# Patient Record
Sex: MALE | Race: BLACK OR AFRICAN AMERICAN | NOT HISPANIC OR LATINO
[De-identification: names, ages, dates, MRNs, and addresses within clinical notes are randomized per-mention and may not be internally consistent; named-entity substitution may affect disease eponyms.]

---

## 2017-01-03 ENCOUNTER — NON-APPOINTMENT (OUTPATIENT)
Age: 52
End: 2017-01-03

## 2017-01-03 ENCOUNTER — APPOINTMENT (OUTPATIENT)
Dept: CARDIOLOGY | Facility: CLINIC | Age: 52
End: 2017-01-03

## 2017-01-03 VITALS
RESPIRATION RATE: 12 BRPM | SYSTOLIC BLOOD PRESSURE: 117 MMHG | HEIGHT: 72 IN | OXYGEN SATURATION: 96 % | TEMPERATURE: 98.1 F | DIASTOLIC BLOOD PRESSURE: 75 MMHG | BODY MASS INDEX: 26.01 KG/M2 | HEART RATE: 67 BPM | WEIGHT: 192 LBS

## 2017-01-03 DIAGNOSIS — R00.2 PALPITATIONS: ICD-10-CM

## 2017-01-04 LAB
CHOLEST SERPL-MCNC: 209 MG/DL
CHOLEST/HDLC SERPL: 2.7 RATIO
HDLC SERPL-MCNC: 77 MG/DL
LDLC SERPL CALC-MCNC: 117 MG/DL
TRIGL SERPL-MCNC: 73 MG/DL

## 2017-01-08 ENCOUNTER — NON-APPOINTMENT (OUTPATIENT)
Age: 52
End: 2017-01-08

## 2017-01-10 LAB
ALBUMIN SERPL ELPH-MCNC: 4.2 G/DL
ALP BLD-CCNC: 79 U/L
ALT SERPL-CCNC: 51 U/L
ANION GAP SERPL CALC-SCNC: 19 MMOL/L
AST SERPL-CCNC: 30 U/L
BILIRUB SERPL-MCNC: 0.4 MG/DL
BUN SERPL-MCNC: 17 MG/DL
CALCIUM SERPL-MCNC: 9.7 MG/DL
CHLORIDE SERPL-SCNC: 102 MMOL/L
CO2 SERPL-SCNC: 22 MMOL/L
CREAT SERPL-MCNC: 1.33 MG/DL
GLUCOSE SERPL-MCNC: 74 MG/DL
POTASSIUM SERPL-SCNC: 4.2 MMOL/L
PROT SERPL-MCNC: 7.6 G/DL
SODIUM SERPL-SCNC: 143 MMOL/L

## 2017-04-03 ENCOUNTER — NON-APPOINTMENT (OUTPATIENT)
Age: 52
End: 2017-04-03

## 2017-04-03 ENCOUNTER — RESULT CHARGE (OUTPATIENT)
Age: 52
End: 2017-04-03

## 2017-04-03 ENCOUNTER — APPOINTMENT (OUTPATIENT)
Dept: CARDIOLOGY | Facility: CLINIC | Age: 52
End: 2017-04-03

## 2017-04-03 VITALS
DIASTOLIC BLOOD PRESSURE: 78 MMHG | OXYGEN SATURATION: 96 % | SYSTOLIC BLOOD PRESSURE: 129 MMHG | HEIGHT: 72 IN | RESPIRATION RATE: 12 BRPM | TEMPERATURE: 98.1 F | BODY MASS INDEX: 26.14 KG/M2 | HEART RATE: 76 BPM | WEIGHT: 193 LBS

## 2017-04-03 RX ORDER — ERGOCALCIFEROL (VITAMIN D2) 1250 MCG
50000 CAPSULE ORAL
Refills: 0 | Status: ACTIVE | COMMUNITY

## 2017-04-04 LAB
25(OH)D3 SERPL-MCNC: 46 NG/ML
CHOLEST SERPL-MCNC: 188 MG/DL
CHOLEST/HDLC SERPL: 2.9 RATIO
HDLC SERPL-MCNC: 64 MG/DL
LDLC SERPL CALC-MCNC: 109 MG/DL
TRIGL SERPL-MCNC: 73 MG/DL

## 2017-04-11 ENCOUNTER — APPOINTMENT (OUTPATIENT)
Dept: CARDIOLOGY | Facility: CLINIC | Age: 52
End: 2017-04-11

## 2017-04-12 LAB
ALBUMIN SERPL ELPH-MCNC: 4.4 G/DL
ALP BLD-CCNC: 105 U/L
ALT SERPL-CCNC: 113 U/L
ANION GAP SERPL CALC-SCNC: 18 MMOL/L
AST SERPL-CCNC: 95 U/L
BILIRUB SERPL-MCNC: 0.2 MG/DL
BUN SERPL-MCNC: 22 MG/DL
CALCIUM SERPL-MCNC: 9.4 MG/DL
CHLORIDE SERPL-SCNC: 104 MMOL/L
CO2 SERPL-SCNC: 22 MMOL/L
CREAT SERPL-MCNC: 1.33 MG/DL
GLUCOSE SERPL-MCNC: 78 MG/DL
POTASSIUM SERPL-SCNC: 4.2 MMOL/L
PROT SERPL-MCNC: 7.1 G/DL
SODIUM SERPL-SCNC: 143 MMOL/L

## 2017-04-19 LAB
ALBUMIN SERPL ELPH-MCNC: 4.3 G/DL
ALP BLD-CCNC: 80 U/L
ALT SERPL-CCNC: 47 U/L
ANION GAP SERPL CALC-SCNC: 21 MMOL/L
AST SERPL-CCNC: 42 U/L
BILIRUB SERPL-MCNC: 0.3 MG/DL
BUN SERPL-MCNC: 16 MG/DL
CALCIUM SERPL-MCNC: 9.6 MG/DL
CHLORIDE SERPL-SCNC: 103 MMOL/L
CO2 SERPL-SCNC: 19 MMOL/L
CREAT SERPL-MCNC: 1.41 MG/DL
GLUCOSE SERPL-MCNC: 64 MG/DL
POTASSIUM SERPL-SCNC: 4.1 MMOL/L
PROT SERPL-MCNC: 7.6 G/DL
SODIUM SERPL-SCNC: 143 MMOL/L

## 2017-04-20 ENCOUNTER — TRANSCRIPTION ENCOUNTER (OUTPATIENT)
Age: 52
End: 2017-04-20

## 2017-04-28 ENCOUNTER — TRANSCRIPTION ENCOUNTER (OUTPATIENT)
Age: 52
End: 2017-04-28

## 2017-05-03 ENCOUNTER — TRANSCRIPTION ENCOUNTER (OUTPATIENT)
Age: 52
End: 2017-05-03

## 2017-08-09 ENCOUNTER — APPOINTMENT (OUTPATIENT)
Dept: CARDIOLOGY | Facility: CLINIC | Age: 52
End: 2017-08-09
Payer: MEDICAID

## 2017-08-09 ENCOUNTER — NON-APPOINTMENT (OUTPATIENT)
Age: 52
End: 2017-08-09

## 2017-08-09 VITALS
BODY MASS INDEX: 26.55 KG/M2 | WEIGHT: 196 LBS | HEART RATE: 89 BPM | SYSTOLIC BLOOD PRESSURE: 114 MMHG | HEIGHT: 72 IN | DIASTOLIC BLOOD PRESSURE: 77 MMHG | OXYGEN SATURATION: 99 % | RESPIRATION RATE: 12 BRPM

## 2017-08-09 PROCEDURE — 99214 OFFICE O/P EST MOD 30 MIN: CPT | Mod: 25

## 2017-08-09 PROCEDURE — 93000 ELECTROCARDIOGRAM COMPLETE: CPT

## 2017-08-10 LAB
ALBUMIN SERPL ELPH-MCNC: 4.4 G/DL
ALP BLD-CCNC: 83 U/L
ALT SERPL-CCNC: 63 U/L
ANION GAP SERPL CALC-SCNC: 15 MMOL/L
AST SERPL-CCNC: 32 U/L
BILIRUB SERPL-MCNC: 0.2 MG/DL
BUN SERPL-MCNC: 16 MG/DL
CALCIUM SERPL-MCNC: 9.4 MG/DL
CHLORIDE SERPL-SCNC: 107 MMOL/L
CHOLEST SERPL-MCNC: 275 MG/DL
CHOLEST/HDLC SERPL: 4.7 RATIO
CO2 SERPL-SCNC: 22 MMOL/L
CREAT SERPL-MCNC: 1.63 MG/DL
GGT SERPL-CCNC: 35 U/L
GLUCOSE SERPL-MCNC: 105 MG/DL
HDLC SERPL-MCNC: 58 MG/DL
LDLC SERPL CALC-MCNC: 173 MG/DL
POTASSIUM SERPL-SCNC: 4.5 MMOL/L
PROT SERPL-MCNC: 7.4 G/DL
SODIUM SERPL-SCNC: 144 MMOL/L
TRIGL SERPL-MCNC: 219 MG/DL

## 2017-08-17 LAB — 25(OH)D3 SERPL-MCNC: 32.7 NG/ML

## 2017-09-01 ENCOUNTER — TRANSCRIPTION ENCOUNTER (OUTPATIENT)
Age: 52
End: 2017-09-01

## 2017-09-06 ENCOUNTER — MEDICATION RENEWAL (OUTPATIENT)
Age: 52
End: 2017-09-06

## 2017-09-13 LAB
ALBUMIN SERPL ELPH-MCNC: 4.4 G/DL
ALP BLD-CCNC: 70 U/L
ALT SERPL-CCNC: 56 U/L
AST SERPL-CCNC: 44 U/L
BILIRUB DIRECT SERPL-MCNC: 0.2 MG/DL
BILIRUB INDIRECT SERPL-MCNC: 0.3 MG/DL
BILIRUB SERPL-MCNC: 0.5 MG/DL
PROT SERPL-MCNC: 8.3 G/DL

## 2017-10-25 ENCOUNTER — APPOINTMENT (OUTPATIENT)
Dept: NEPHROLOGY | Facility: CLINIC | Age: 52
End: 2017-10-25
Payer: MEDICAID

## 2017-10-25 VITALS
HEIGHT: 72 IN | DIASTOLIC BLOOD PRESSURE: 76 MMHG | BODY MASS INDEX: 26.28 KG/M2 | OXYGEN SATURATION: 98 % | WEIGHT: 194 LBS | SYSTOLIC BLOOD PRESSURE: 108 MMHG | HEART RATE: 66 BPM

## 2017-10-25 PROCEDURE — 99204 OFFICE O/P NEW MOD 45 MIN: CPT

## 2017-10-25 PROCEDURE — 99213 OFFICE O/P EST LOW 20 MIN: CPT

## 2017-10-25 RX ORDER — AMOXICILLIN AND CLAVULANATE POTASSIUM 875; 125 MG/1; MG/1
875-125 TABLET, COATED ORAL
Qty: 20 | Refills: 0 | Status: COMPLETED | COMMUNITY
Start: 2017-05-03

## 2017-10-27 LAB
25(OH)D3 SERPL-MCNC: 52.4 NG/ML
ALBUMIN SERPL ELPH-MCNC: 4.2 G/DL
ANA SER IF-ACNC: NEGATIVE
ANION GAP SERPL CALC-SCNC: 15 MMOL/L
APPEARANCE: CLEAR
BACTERIA: NEGATIVE
BASOPHILS # BLD AUTO: 0.02 K/UL
BASOPHILS NFR BLD AUTO: 0.3 %
BILIRUBIN URINE: NEGATIVE
BLOOD URINE: NEGATIVE
BUN SERPL-MCNC: 14 MG/DL
CALCIUM SERPL-MCNC: 10.4 MG/DL
CALCIUM SERPL-MCNC: 10.4 MG/DL
CHLORIDE SERPL-SCNC: 103 MMOL/L
CK SERPL-CCNC: 191 U/L
CO2 SERPL-SCNC: 23 MMOL/L
COLOR: YELLOW
CREAT SERPL-MCNC: 1.11 MG/DL
CREAT SPEC-SCNC: 227 MG/DL
CREAT/PROT UR: 0 RATIO
DSDNA AB SER-ACNC: <12 IU/ML
EOSINOPHIL # BLD AUTO: 0.16 K/UL
EOSINOPHIL NFR BLD AUTO: 2.6 %
GLUCOSE QUALITATIVE U: NEGATIVE MG/DL
GLUCOSE SERPL-MCNC: 84 MG/DL
HCT VFR BLD CALC: 43.5 %
HGB BLD-MCNC: 14.1 G/DL
HYALINE CASTS: 0 /LPF
IMM GRANULOCYTES NFR BLD AUTO: 0.2 %
KETONES URINE: NEGATIVE
LEUKOCYTE ESTERASE URINE: NEGATIVE
LYMPHOCYTES # BLD AUTO: 2.26 K/UL
LYMPHOCYTES NFR BLD AUTO: 37.4 %
MAN DIFF?: NORMAL
MCHC RBC-ENTMCNC: 27.8 PG
MCHC RBC-ENTMCNC: 32.4 GM/DL
MCV RBC AUTO: 85.8 FL
MICROSCOPIC-UA: NORMAL
MONOCYTES # BLD AUTO: 0.79 K/UL
MONOCYTES NFR BLD AUTO: 13.1 %
NEUTROPHILS # BLD AUTO: 2.8 K/UL
NEUTROPHILS NFR BLD AUTO: 46.4 %
NITRITE URINE: NEGATIVE
PARATHYROID HORMONE INTACT: 28 PG/ML
PH URINE: 5.5
PHOSPHATE SERPL-MCNC: 3.2 MG/DL
PLATELET # BLD AUTO: 335 K/UL
POTASSIUM SERPL-SCNC: 4.3 MMOL/L
PROT UR-MCNC: 11 MG/DL
PROTEIN URINE: NEGATIVE MG/DL
RBC # BLD: 5.07 M/UL
RBC # FLD: 14.1 %
RED BLOOD CELLS URINE: 0 /HPF
SODIUM SERPL-SCNC: 141 MMOL/L
SPECIFIC GRAVITY URINE: 1.02
SQUAMOUS EPITHELIAL CELLS: 0 /HPF
UROBILINOGEN URINE: NEGATIVE MG/DL
WBC # FLD AUTO: 6.04 K/UL
WHITE BLOOD CELLS URINE: 0 /HPF

## 2017-11-14 ENCOUNTER — NON-APPOINTMENT (OUTPATIENT)
Age: 52
End: 2017-11-14

## 2017-11-14 ENCOUNTER — APPOINTMENT (OUTPATIENT)
Dept: CARDIOLOGY | Facility: CLINIC | Age: 52
End: 2017-11-14
Payer: MEDICAID

## 2017-11-14 VITALS
BODY MASS INDEX: 26.01 KG/M2 | RESPIRATION RATE: 12 BRPM | OXYGEN SATURATION: 97 % | DIASTOLIC BLOOD PRESSURE: 76 MMHG | HEART RATE: 87 BPM | WEIGHT: 192 LBS | SYSTOLIC BLOOD PRESSURE: 114 MMHG | HEIGHT: 72 IN

## 2017-11-14 PROCEDURE — 99214 OFFICE O/P EST MOD 30 MIN: CPT | Mod: 25

## 2017-11-14 PROCEDURE — 93000 ELECTROCARDIOGRAM COMPLETE: CPT

## 2017-11-19 ENCOUNTER — NON-APPOINTMENT (OUTPATIENT)
Age: 52
End: 2017-11-19

## 2017-11-20 LAB
CHOLEST SERPL-MCNC: 195 MG/DL
CHOLEST/HDLC SERPL: 3.2 RATIO
HDLC SERPL-MCNC: 61 MG/DL
LDLC SERPL CALC-MCNC: 124 MG/DL
TRIGL SERPL-MCNC: 52 MG/DL

## 2017-12-11 DIAGNOSIS — R74.8 ABNORMAL LEVELS OF OTHER SERUM ENZYMES: ICD-10-CM

## 2017-12-11 LAB
ALBUMIN SERPL ELPH-MCNC: 4.6 G/DL
ALP BLD-CCNC: 87 U/L
ALT SERPL-CCNC: 53 U/L
ANION GAP SERPL CALC-SCNC: 15 MMOL/L
AST SERPL-CCNC: 29 U/L
BILIRUB SERPL-MCNC: 0.4 MG/DL
BUN SERPL-MCNC: 14 MG/DL
CALCIUM SERPL-MCNC: 9.8 MG/DL
CHLORIDE SERPL-SCNC: 102 MMOL/L
CO2 SERPL-SCNC: 24 MMOL/L
CREAT SERPL-MCNC: 1.33 MG/DL
GLUCOSE SERPL-MCNC: 94 MG/DL
POTASSIUM SERPL-SCNC: 4.5 MMOL/L
PROT SERPL-MCNC: 8.4 G/DL
SODIUM SERPL-SCNC: 141 MMOL/L

## 2018-02-14 LAB
ALBUMIN SERPL ELPH-MCNC: 4.2 G/DL
ALP BLD-CCNC: 69 U/L
ALT SERPL-CCNC: 60 U/L
ANION GAP SERPL CALC-SCNC: 14 MMOL/L
AST SERPL-CCNC: 34 U/L
BILIRUB SERPL-MCNC: 0.2 MG/DL
BUN SERPL-MCNC: 15 MG/DL
CALCIUM SERPL-MCNC: 9.6 MG/DL
CHLORIDE SERPL-SCNC: 105 MMOL/L
CO2 SERPL-SCNC: 25 MMOL/L
CREAT SERPL-MCNC: 1.41 MG/DL
GLUCOSE SERPL-MCNC: 82 MG/DL
POTASSIUM SERPL-SCNC: 4.3 MMOL/L
PROT SERPL-MCNC: 7.7 G/DL
SODIUM SERPL-SCNC: 144 MMOL/L

## 2018-04-03 ENCOUNTER — APPOINTMENT (OUTPATIENT)
Dept: CARDIOLOGY | Facility: CLINIC | Age: 53
End: 2018-04-03

## 2018-04-24 ENCOUNTER — RESULT REVIEW (OUTPATIENT)
Age: 53
End: 2018-04-24

## 2018-05-23 DIAGNOSIS — R19.8 OTHER SPECIFIED SYMPTOMS AND SIGNS INVOLVING THE DIGESTIVE SYSTEM AND ABDOMEN: ICD-10-CM

## 2018-06-06 ENCOUNTER — RX RENEWAL (OUTPATIENT)
Age: 53
End: 2018-06-06

## 2018-07-05 ENCOUNTER — APPOINTMENT (OUTPATIENT)
Dept: GASTROENTEROLOGY | Facility: HOSPITAL | Age: 53
End: 2018-07-05

## 2018-07-05 ENCOUNTER — RESULT REVIEW (OUTPATIENT)
Age: 53
End: 2018-07-05

## 2018-07-05 ENCOUNTER — OUTPATIENT (OUTPATIENT)
Dept: OUTPATIENT SERVICES | Facility: HOSPITAL | Age: 53
LOS: 1 days | Discharge: HOME | End: 2018-07-05
Payer: MEDICAID

## 2018-07-05 DIAGNOSIS — K31.9 DISEASE OF STOMACH AND DUODENUM, UNSPECIFIED: ICD-10-CM

## 2018-07-05 PROCEDURE — 88305 TISSUE EXAM BY PATHOLOGIST: CPT | Mod: 26

## 2018-07-05 PROCEDURE — 43259 EGD US EXAM DUODENUM/JEJUNUM: CPT | Mod: GC

## 2018-07-05 PROCEDURE — 43239 EGD BIOPSY SINGLE/MULTIPLE: CPT | Mod: 59,GC

## 2018-07-12 ENCOUNTER — NON-APPOINTMENT (OUTPATIENT)
Age: 53
End: 2018-07-12

## 2018-07-12 ENCOUNTER — APPOINTMENT (OUTPATIENT)
Dept: CARDIOLOGY | Facility: CLINIC | Age: 53
End: 2018-07-12
Payer: MEDICAID

## 2018-07-12 VITALS
OXYGEN SATURATION: 97 % | HEART RATE: 78 BPM | TEMPERATURE: 98.1 F | WEIGHT: 195 LBS | RESPIRATION RATE: 12 BRPM | BODY MASS INDEX: 26.41 KG/M2 | DIASTOLIC BLOOD PRESSURE: 76 MMHG | SYSTOLIC BLOOD PRESSURE: 120 MMHG | HEIGHT: 72 IN

## 2018-07-12 PROCEDURE — 93000 ELECTROCARDIOGRAM COMPLETE: CPT

## 2018-07-12 PROCEDURE — 99214 OFFICE O/P EST MOD 30 MIN: CPT | Mod: 25

## 2018-07-13 ENCOUNTER — CHART COPY (OUTPATIENT)
Age: 53
End: 2018-07-13

## 2018-08-13 LAB
ALBUMIN SERPL ELPH-MCNC: 4.2 G/DL
ALP BLD-CCNC: 74 U/L
ALT SERPL-CCNC: 134 U/L
ANION GAP SERPL CALC-SCNC: 15 MMOL/L
AST SERPL-CCNC: 201 U/L
BILIRUB SERPL-MCNC: 0.3 MG/DL
BUN SERPL-MCNC: 24 MG/DL
CALCIUM SERPL-MCNC: 9.7 MG/DL
CHLORIDE SERPL-SCNC: 100 MMOL/L
CHOLEST SERPL-MCNC: 185 MG/DL
CHOLEST/HDLC SERPL: 3.1 RATIO
CO2 SERPL-SCNC: 24 MMOL/L
CREAT SERPL-MCNC: 1.78 MG/DL
GLUCOSE SERPL-MCNC: 82 MG/DL
HDLC SERPL-MCNC: 60 MG/DL
LDLC SERPL CALC-MCNC: 105 MG/DL
POTASSIUM SERPL-SCNC: 4.3 MMOL/L
PROT SERPL-MCNC: 7.7 G/DL
SODIUM SERPL-SCNC: 139 MMOL/L
TRIGL SERPL-MCNC: 100 MG/DL

## 2018-08-31 LAB
ALBUMIN SERPL ELPH-MCNC: 4.6 G/DL
ALP BLD-CCNC: 70 U/L
ALT SERPL-CCNC: 39 U/L
ANION GAP SERPL CALC-SCNC: 15 MMOL/L
AST SERPL-CCNC: 33 U/L
BILIRUB SERPL-MCNC: 0.4 MG/DL
BUN SERPL-MCNC: 14 MG/DL
CALCIUM SERPL-MCNC: 9.5 MG/DL
CHLORIDE SERPL-SCNC: 101 MMOL/L
CO2 SERPL-SCNC: 26 MMOL/L
CREAT SERPL-MCNC: 1.68 MG/DL
GLUCOSE SERPL-MCNC: 108 MG/DL
POTASSIUM SERPL-SCNC: 4.3 MMOL/L
PROT SERPL-MCNC: 7.8 G/DL
SODIUM SERPL-SCNC: 142 MMOL/L

## 2018-08-31 RX ORDER — ROSUVASTATIN CALCIUM 5 MG/1
5 TABLET, FILM COATED ORAL DAILY
Qty: 30 | Refills: 3 | Status: DISCONTINUED | COMMUNITY
Start: 2017-08-17 | End: 2018-08-31

## 2018-11-02 LAB
ALBUMIN SERPL ELPH-MCNC: 4.4 G/DL
ALP BLD-CCNC: 80 U/L
ALT SERPL-CCNC: 49 U/L
ANION GAP SERPL CALC-SCNC: 14 MMOL/L
AST SERPL-CCNC: 35 U/L
BILIRUB SERPL-MCNC: 0.3 MG/DL
BUN SERPL-MCNC: 12 MG/DL
CALCIUM SERPL-MCNC: 9.5 MG/DL
CHLORIDE SERPL-SCNC: 104 MMOL/L
CHOLEST SERPL-MCNC: 233 MG/DL
CHOLEST/HDLC SERPL: 3.6 RATIO
CO2 SERPL-SCNC: 26 MMOL/L
CREAT SERPL-MCNC: 1.33 MG/DL
GLUCOSE SERPL-MCNC: 73 MG/DL
HDLC SERPL-MCNC: 65 MG/DL
LDLC SERPL CALC-MCNC: 150 MG/DL
POTASSIUM SERPL-SCNC: 4.7 MMOL/L
PROT SERPL-MCNC: 8 G/DL
SODIUM SERPL-SCNC: 144 MMOL/L
TRIGL SERPL-MCNC: 91 MG/DL

## 2018-11-14 ENCOUNTER — NON-APPOINTMENT (OUTPATIENT)
Age: 53
End: 2018-11-14

## 2018-11-14 ENCOUNTER — APPOINTMENT (OUTPATIENT)
Dept: CARDIOLOGY | Facility: CLINIC | Age: 53
End: 2018-11-14
Payer: MEDICAID

## 2018-11-14 VITALS
BODY MASS INDEX: 25.73 KG/M2 | HEIGHT: 72 IN | SYSTOLIC BLOOD PRESSURE: 107 MMHG | TEMPERATURE: 97.8 F | WEIGHT: 190 LBS | HEART RATE: 65 BPM | OXYGEN SATURATION: 96 % | DIASTOLIC BLOOD PRESSURE: 71 MMHG | RESPIRATION RATE: 12 BRPM

## 2018-11-14 PROCEDURE — 99214 OFFICE O/P EST MOD 30 MIN: CPT | Mod: 25

## 2018-11-14 PROCEDURE — 93000 ELECTROCARDIOGRAM COMPLETE: CPT

## 2018-11-14 NOTE — PHYSICAL EXAM
[General Appearance - Well Developed] : well developed [Normal Appearance] : normal appearance [Well Groomed] : well groomed [General Appearance - Well Nourished] : well nourished [No Deformities] : no deformities [General Appearance - In No Acute Distress] : no acute distress [Normal Conjunctiva] : the conjunctiva exhibited no abnormalities [Normal Oral Mucosa] : normal oral mucosa [No Oral Pallor] : no oral pallor [No Oral Cyanosis] : no oral cyanosis [Normal Jugular Venous A Waves Present] : normal jugular venous A waves present [Normal Jugular Venous V Waves Present] : normal jugular venous V waves present [No Jugular Venous Felipe A Waves] : no jugular venous felipe A waves [Respiration, Rhythm And Depth] : normal respiratory rhythm and effort [Exaggerated Use Of Accessory Muscles For Inspiration] : no accessory muscle use [Auscultation Breath Sounds / Voice Sounds] : lungs were clear to auscultation bilaterally [Normal Rate] : normal [Rhythm Regular] : regular [Normal S1] : normal S1 [Normal S2] : normal S2 [No Murmur] : no murmurs heard [No Pitting Edema] : no pitting edema present [Bowel Sounds] : normal bowel sounds [Abdomen Soft] : soft [Abdomen Tenderness] : non-tender [Abnormal Walk] : normal gait [Gait - Sufficient For Exercise Testing] : the gait was sufficient for exercise testing [Nail Clubbing] : no clubbing of the fingernails [Cyanosis, Localized] : no localized cyanosis [Petechial Hemorrhages (___cm)] : no petechial hemorrhages [Skin Color & Pigmentation] : normal skin color and pigmentation [] : no rash [Skin Lesions] : no skin lesions [No Skin Ulcers] : no skin ulcer [Oriented To Time, Place, And Person] : oriented to person, place, and time [Affect] : the affect was normal [Mood] : the mood was normal [No Anxiety] : not feeling anxious [FreeTextEntry1] : Extraocular muscles intact. Anicteric sclerae. [S3] : no S3 [Right Carotid Bruit] : no bruit heard over the right carotid [Left Carotid Bruit] : no bruit heard over the left carotid [Bruit] : no bruit heard

## 2018-11-14 NOTE — DISCUSSION/SUMMARY
[FreeTextEntry1] : IMPRESSION: Mr. Moctezuma is a 52 year old man with a history of hyperlipidemia, PFO, elevated LFTs, renal insufficiency, vitamin D deficiency, H. pylori, and former tobacco use who presents today for followup of hyperlipidemia.\par \par PLAN:\par 1. His most recent cholesterol was elevated, however, he states that he started taking Pravastatin 10mg daily about 2 weeks ago. He will continue on diet modification given his hyperlipidemia. His LFTs were elevated on Crestor. He will come back in 4 weeks to check a lipid profile and CMP. He is scheduled to see his nephrologist in early December given his renal insufficiency.\par 2. He will followup with me in 3 months or sooner should there be any significant abnormalities on his blood work. \par 3. He will follow up with GI given his elevated LFTs, which revealed a mildly elevated ALT on his most recent blood work.

## 2018-11-26 ENCOUNTER — RX RENEWAL (OUTPATIENT)
Age: 53
End: 2018-11-26

## 2018-12-05 ENCOUNTER — APPOINTMENT (OUTPATIENT)
Dept: NEPHROLOGY | Facility: CLINIC | Age: 53
End: 2018-12-05

## 2018-12-19 ENCOUNTER — RX RENEWAL (OUTPATIENT)
Age: 53
End: 2018-12-19

## 2019-02-06 LAB
ALBUMIN SERPL ELPH-MCNC: 4 G/DL
ALP BLD-CCNC: 85 U/L
ALT SERPL-CCNC: 41 U/L
ANION GAP SERPL CALC-SCNC: 11 MMOL/L
AST SERPL-CCNC: 34 U/L
BILIRUB SERPL-MCNC: 0.3 MG/DL
BUN SERPL-MCNC: 14 MG/DL
CALCIUM SERPL-MCNC: 9.3 MG/DL
CHLORIDE SERPL-SCNC: 103 MMOL/L
CHOLEST SERPL-MCNC: 223 MG/DL
CHOLEST/HDLC SERPL: 3.1 RATIO
CO2 SERPL-SCNC: 26 MMOL/L
CREAT SERPL-MCNC: 1.14 MG/DL
GLUCOSE SERPL-MCNC: 81 MG/DL
HDLC SERPL-MCNC: 73 MG/DL
LDLC SERPL CALC-MCNC: 125 MG/DL
POTASSIUM SERPL-SCNC: 4.2 MMOL/L
PROT SERPL-MCNC: 7.5 G/DL
SODIUM SERPL-SCNC: 140 MMOL/L
TRIGL SERPL-MCNC: 124 MG/DL

## 2019-03-01 ENCOUNTER — TRANSCRIPTION ENCOUNTER (OUTPATIENT)
Age: 54
End: 2019-03-01

## 2019-03-06 ENCOUNTER — RX RENEWAL (OUTPATIENT)
Age: 54
End: 2019-03-06

## 2019-06-03 ENCOUNTER — RX RENEWAL (OUTPATIENT)
Age: 54
End: 2019-06-03

## 2019-06-07 ENCOUNTER — NON-APPOINTMENT (OUTPATIENT)
Age: 54
End: 2019-06-07

## 2019-06-07 ENCOUNTER — APPOINTMENT (OUTPATIENT)
Dept: CARDIOLOGY | Facility: CLINIC | Age: 54
End: 2019-06-07
Payer: MEDICAID

## 2019-06-07 VITALS
HEIGHT: 72 IN | WEIGHT: 195 LBS | HEART RATE: 74 BPM | DIASTOLIC BLOOD PRESSURE: 76 MMHG | RESPIRATION RATE: 12 BRPM | OXYGEN SATURATION: 98 % | BODY MASS INDEX: 26.41 KG/M2 | SYSTOLIC BLOOD PRESSURE: 113 MMHG

## 2019-06-07 PROCEDURE — 93000 ELECTROCARDIOGRAM COMPLETE: CPT

## 2019-06-07 PROCEDURE — 99213 OFFICE O/P EST LOW 20 MIN: CPT | Mod: 25

## 2019-06-07 NOTE — HISTORY OF PRESENT ILLNESS
[FreeTextEntry1] : Patient is a 53 year old man with a history of hyperlipidemia, PFO, elevated LFTs, renal insufficiency, H. pylori, and former tobacco use who presents today for followup of hyperlipidemia. He states that he has been feeling well denying any chest pain, dyspnea, palpitations, headaches, and dizziness. He states that he has tolerated taking Pravastatin and has been watching diet.

## 2019-06-07 NOTE — PHYSICAL EXAM
[General Appearance - Well Developed] : well developed [Normal Appearance] : normal appearance [Well Groomed] : well groomed [General Appearance - Well Nourished] : well nourished [No Deformities] : no deformities [General Appearance - In No Acute Distress] : no acute distress [Normal Conjunctiva] : the conjunctiva exhibited no abnormalities [FreeTextEntry1] : Extraocular muscles intact. Anicteric sclerae. [Normal Oral Mucosa] : normal oral mucosa [No Oral Pallor] : no oral pallor [No Oral Cyanosis] : no oral cyanosis [Normal Jugular Venous A Waves Present] : normal jugular venous A waves present [No Jugular Venous Felipe A Waves] : no jugular venous felipe A waves [Normal Jugular Venous V Waves Present] : normal jugular venous V waves present [Exaggerated Use Of Accessory Muscles For Inspiration] : no accessory muscle use [Respiration, Rhythm And Depth] : normal respiratory rhythm and effort [Auscultation Breath Sounds / Voice Sounds] : lungs were clear to auscultation bilaterally [Bowel Sounds] : normal bowel sounds [Abdomen Soft] : soft [Abdomen Tenderness] : non-tender [Abnormal Walk] : normal gait [Gait - Sufficient For Exercise Testing] : the gait was sufficient for exercise testing [Nail Clubbing] : no clubbing of the fingernails [Cyanosis, Localized] : no localized cyanosis [Petechial Hemorrhages (___cm)] : no petechial hemorrhages [Skin Color & Pigmentation] : normal skin color and pigmentation [] : no rash [Skin Lesions] : no skin lesions [No Skin Ulcers] : no skin ulcer [Oriented To Time, Place, And Person] : oriented to person, place, and time [Affect] : the affect was normal [No Anxiety] : not feeling anxious [Mood] : the mood was normal [Normal Rate] : normal [Rhythm Regular] : regular [Normal S1] : normal S1 [Normal S2] : normal S2 [S3] : no S3 [No Murmur] : no murmurs heard [Right Carotid Bruit] : no bruit heard over the right carotid [Left Carotid Bruit] : no bruit heard over the left carotid [Bruit] : no bruit heard [No Pitting Edema] : no pitting edema present

## 2019-06-07 NOTE — DISCUSSION/SUMMARY
[FreeTextEntry1] : IMPRESSION: Mr. Moctezuma is a 53 year old man with a history of hyperlipidemia, PFO, elevated LFTs, renal insufficiency, vitamin D deficiency, H. pylori, and former tobacco use who presents today for followup of hyperlipidemia.\par \par PLAN:\par 1. His most recent LDL was improved. I will be checking a lipid profile and CMP today. He will continue on Pravastatin 10mg daily as well as diet modification. \par 2. He will continue on ASA 81 mg daily given his PFO.\par 3. He will followup with me in 6 months or sooner should he experience any symptoms in the interim.

## 2019-06-18 ENCOUNTER — TRANSCRIPTION ENCOUNTER (OUTPATIENT)
Age: 54
End: 2019-06-18

## 2019-06-19 LAB
25(OH)D3 SERPL-MCNC: 46.4 NG/ML
BASOPHILS # BLD AUTO: 0.03 K/UL
BASOPHILS NFR BLD AUTO: 0.5 %
CHOLEST SERPL-MCNC: 213 MG/DL
CHOLEST/HDLC SERPL: 3.4 RATIO
EOSINOPHIL # BLD AUTO: 0.36 K/UL
EOSINOPHIL NFR BLD AUTO: 6.6 %
HCT VFR BLD CALC: 48.9 %
HDLC SERPL-MCNC: 62 MG/DL
HGB BLD-MCNC: 14.6 G/DL
IMM GRANULOCYTES NFR BLD AUTO: 0 %
LDLC SERPL CALC-MCNC: 135 MG/DL
LYMPHOCYTES # BLD AUTO: 2.48 K/UL
LYMPHOCYTES NFR BLD AUTO: 45.4 %
MAN DIFF?: NORMAL
MCHC RBC-ENTMCNC: 27.4 PG
MCHC RBC-ENTMCNC: 29.9 GM/DL
MCV RBC AUTO: 91.7 FL
MONOCYTES # BLD AUTO: 0.75 K/UL
MONOCYTES NFR BLD AUTO: 13.7 %
NEUTROPHILS # BLD AUTO: 1.84 K/UL
NEUTROPHILS NFR BLD AUTO: 33.8 %
PLATELET # BLD AUTO: 276 K/UL
RBC # BLD: 5.33 M/UL
RBC # FLD: 14.9 %
TRIGL SERPL-MCNC: 79 MG/DL
TSH SERPL-ACNC: 0.88 UIU/ML
WBC # FLD AUTO: 5.46 K/UL

## 2019-07-01 LAB
ALBUMIN SERPL ELPH-MCNC: 4.4 G/DL
ALP BLD-CCNC: 75 U/L
ALT SERPL-CCNC: 60 U/L
ANION GAP SERPL CALC-SCNC: 14 MMOL/L
AST SERPL-CCNC: 33 U/L
BILIRUB SERPL-MCNC: 0.4 MG/DL
BUN SERPL-MCNC: 17 MG/DL
CALCIUM SERPL-MCNC: 9.5 MG/DL
CHLORIDE SERPL-SCNC: 101 MMOL/L
CO2 SERPL-SCNC: 25 MMOL/L
CREAT SERPL-MCNC: 1.3 MG/DL
GLUCOSE SERPL-MCNC: 82 MG/DL
POTASSIUM SERPL-SCNC: 4.8 MMOL/L
PROT SERPL-MCNC: 7.5 G/DL
SODIUM SERPL-SCNC: 140 MMOL/L

## 2019-10-01 ENCOUNTER — RX RENEWAL (OUTPATIENT)
Age: 54
End: 2019-10-01

## 2019-10-03 ENCOUNTER — APPOINTMENT (OUTPATIENT)
Dept: CARDIOLOGY | Facility: CLINIC | Age: 54
End: 2019-10-03
Payer: COMMERCIAL

## 2019-10-03 ENCOUNTER — INPATIENT (INPATIENT)
Facility: HOSPITAL | Age: 54
LOS: 2 days | Discharge: HOME | End: 2019-10-06
Attending: UROLOGY | Admitting: UROLOGY
Payer: MEDICAID

## 2019-10-03 VITALS
SYSTOLIC BLOOD PRESSURE: 99 MMHG | OXYGEN SATURATION: 96 % | HEART RATE: 91 BPM | TEMPERATURE: 98 F | DIASTOLIC BLOOD PRESSURE: 75 MMHG | RESPIRATION RATE: 20 BRPM

## 2019-10-03 VITALS
HEART RATE: 69 BPM | HEIGHT: 72 IN | DIASTOLIC BLOOD PRESSURE: 78 MMHG | WEIGHT: 186 LBS | TEMPERATURE: 97.5 F | BODY MASS INDEX: 25.19 KG/M2 | SYSTOLIC BLOOD PRESSURE: 131 MMHG | OXYGEN SATURATION: 98 % | RESPIRATION RATE: 14 BRPM

## 2019-10-03 LAB
ALBUMIN SERPL ELPH-MCNC: 4.8 G/DL — SIGNIFICANT CHANGE UP (ref 3.3–5)
ALP SERPL-CCNC: 75 U/L — SIGNIFICANT CHANGE UP (ref 40–120)
ALT FLD-CCNC: 40 U/L — SIGNIFICANT CHANGE UP (ref 4–41)
ANION GAP SERPL CALC-SCNC: 14 MMO/L — SIGNIFICANT CHANGE UP (ref 7–14)
AST SERPL-CCNC: 50 U/L — HIGH (ref 4–40)
BASE EXCESS BLDV CALC-SCNC: 3.9 MMOL/L — SIGNIFICANT CHANGE UP
BASE EXCESS BLDV CALC-SCNC: 5.1 MMOL/L — SIGNIFICANT CHANGE UP
BASOPHILS # BLD AUTO: 0.03 K/UL — SIGNIFICANT CHANGE UP (ref 0–0.2)
BASOPHILS NFR BLD AUTO: 0.3 % — SIGNIFICANT CHANGE UP (ref 0–2)
BILIRUB SERPL-MCNC: 0.4 MG/DL — SIGNIFICANT CHANGE UP (ref 0.2–1.2)
BLOOD GAS VENOUS - CREATININE: 1.41 MG/DL — HIGH (ref 0.5–1.3)
BLOOD GAS VENOUS - CREATININE: 1.46 MG/DL — HIGH (ref 0.5–1.3)
BLOOD GAS VENOUS - FIO2: 21 — SIGNIFICANT CHANGE UP
BLOOD GAS VENOUS - FIO2: 21 — SIGNIFICANT CHANGE UP
BUN SERPL-MCNC: 13 MG/DL — SIGNIFICANT CHANGE UP (ref 7–23)
CALCIUM SERPL-MCNC: 9.8 MG/DL — SIGNIFICANT CHANGE UP (ref 8.4–10.5)
CHLORIDE BLDV-SCNC: 102 MMOL/L — SIGNIFICANT CHANGE UP (ref 96–108)
CHLORIDE BLDV-SCNC: 104 MMOL/L — SIGNIFICANT CHANGE UP (ref 96–108)
CHLORIDE SERPL-SCNC: 99 MMOL/L — SIGNIFICANT CHANGE UP (ref 98–107)
CO2 SERPL-SCNC: 26 MMOL/L — SIGNIFICANT CHANGE UP (ref 22–31)
CREAT SERPL-MCNC: 1.34 MG/DL — HIGH (ref 0.5–1.3)
EOSINOPHIL # BLD AUTO: 0 K/UL — SIGNIFICANT CHANGE UP (ref 0–0.5)
EOSINOPHIL NFR BLD AUTO: 0 % — SIGNIFICANT CHANGE UP (ref 0–6)
GAS PNL BLDV: 140 MMOL/L — SIGNIFICANT CHANGE UP (ref 136–146)
GAS PNL BLDV: 140 MMOL/L — SIGNIFICANT CHANGE UP (ref 136–146)
GLUCOSE BLDV-MCNC: 124 MG/DL — HIGH (ref 70–99)
GLUCOSE BLDV-MCNC: 144 MG/DL — HIGH (ref 70–99)
GLUCOSE SERPL-MCNC: 142 MG/DL — HIGH (ref 70–99)
HCO3 BLDV-SCNC: 25 MMOL/L — SIGNIFICANT CHANGE UP (ref 20–27)
HCO3 BLDV-SCNC: 26 MMOL/L — SIGNIFICANT CHANGE UP (ref 20–27)
HCT VFR BLD CALC: 51.5 % — HIGH (ref 39–50)
HCT VFR BLDV CALC: 42.6 % — SIGNIFICANT CHANGE UP (ref 39–51)
HCT VFR BLDV CALC: 46.9 % — SIGNIFICANT CHANGE UP (ref 39–51)
HGB BLD-MCNC: 15.2 G/DL — SIGNIFICANT CHANGE UP (ref 13–17)
HGB BLDV-MCNC: 13.9 G/DL — SIGNIFICANT CHANGE UP (ref 13–17)
HGB BLDV-MCNC: 15.3 G/DL — SIGNIFICANT CHANGE UP (ref 13–17)
IMM GRANULOCYTES NFR BLD AUTO: 0.4 % — SIGNIFICANT CHANGE UP (ref 0–1.5)
LACTATE BLDV-MCNC: 3.4 MMOL/L — HIGH (ref 0.5–2)
LACTATE BLDV-MCNC: 3.9 MMOL/L — HIGH (ref 0.5–2)
LIDOCAIN IGE QN: 20.7 U/L — SIGNIFICANT CHANGE UP (ref 7–60)
LYMPHOCYTES # BLD AUTO: 1.3 K/UL — SIGNIFICANT CHANGE UP (ref 1–3.3)
LYMPHOCYTES # BLD AUTO: 13.6 % — SIGNIFICANT CHANGE UP (ref 13–44)
MCHC RBC-ENTMCNC: 26.4 PG — LOW (ref 27–34)
MCHC RBC-ENTMCNC: 29.5 % — LOW (ref 32–36)
MCV RBC AUTO: 89.4 FL — SIGNIFICANT CHANGE UP (ref 80–100)
MONOCYTES # BLD AUTO: 0.68 K/UL — SIGNIFICANT CHANGE UP (ref 0–0.9)
MONOCYTES NFR BLD AUTO: 7.1 % — SIGNIFICANT CHANGE UP (ref 2–14)
NEUTROPHILS # BLD AUTO: 7.53 K/UL — HIGH (ref 1.8–7.4)
NEUTROPHILS NFR BLD AUTO: 78.6 % — HIGH (ref 43–77)
NRBC # FLD: 0 K/UL — SIGNIFICANT CHANGE UP (ref 0–0)
PCO2 BLDV: 63 MMHG — HIGH (ref 41–51)
PCO2 BLDV: 63 MMHG — HIGH (ref 41–51)
PH BLDV: 7.3 PH — LOW (ref 7.32–7.43)
PH BLDV: 7.31 PH — LOW (ref 7.32–7.43)
PLATELET # BLD AUTO: 355 K/UL — SIGNIFICANT CHANGE UP (ref 150–400)
PMV BLD: 10.2 FL — SIGNIFICANT CHANGE UP (ref 7–13)
PO2 BLDV: 28 MMHG — LOW (ref 35–40)
PO2 BLDV: < 24 MMHG — LOW (ref 35–40)
POTASSIUM BLDV-SCNC: 4.3 MMOL/L — SIGNIFICANT CHANGE UP (ref 3.4–4.5)
POTASSIUM BLDV-SCNC: 4.5 MMOL/L — SIGNIFICANT CHANGE UP (ref 3.4–4.5)
POTASSIUM SERPL-MCNC: 5.3 MMOL/L — SIGNIFICANT CHANGE UP (ref 3.5–5.3)
POTASSIUM SERPL-SCNC: 5.3 MMOL/L — SIGNIFICANT CHANGE UP (ref 3.5–5.3)
PROT SERPL-MCNC: 8.4 G/DL — HIGH (ref 6–8.3)
RBC # BLD: 5.76 M/UL — SIGNIFICANT CHANGE UP (ref 4.2–5.8)
RBC # FLD: 14.5 % — SIGNIFICANT CHANGE UP (ref 10.3–14.5)
SAO2 % BLDV: 27.6 % — LOW (ref 60–85)
SAO2 % BLDV: 42.6 % — LOW (ref 60–85)
SODIUM SERPL-SCNC: 139 MMOL/L — SIGNIFICANT CHANGE UP (ref 135–145)
WBC # BLD: 9.58 K/UL — SIGNIFICANT CHANGE UP (ref 3.8–10.5)
WBC # FLD AUTO: 9.58 K/UL — SIGNIFICANT CHANGE UP (ref 3.8–10.5)

## 2019-10-03 PROCEDURE — 99215 OFFICE O/P EST HI 40 MIN: CPT | Mod: 25

## 2019-10-03 PROCEDURE — 74176 CT ABD & PELVIS W/O CONTRAST: CPT | Mod: 26,GC

## 2019-10-03 RX ORDER — SODIUM CHLORIDE 9 MG/ML
1000 INJECTION INTRAMUSCULAR; INTRAVENOUS; SUBCUTANEOUS ONCE
Refills: 0 | Status: COMPLETED | OUTPATIENT
Start: 2019-10-03 | End: 2019-10-03

## 2019-10-03 RX ORDER — MORPHINE SULFATE 50 MG/1
4 CAPSULE, EXTENDED RELEASE ORAL ONCE
Refills: 0 | Status: DISCONTINUED | OUTPATIENT
Start: 2019-10-03 | End: 2019-10-03

## 2019-10-03 RX ORDER — ONDANSETRON 8 MG/1
4 TABLET, FILM COATED ORAL ONCE
Refills: 0 | Status: COMPLETED | OUTPATIENT
Start: 2019-10-03 | End: 2019-10-03

## 2019-10-03 RX ORDER — KETOROLAC TROMETHAMINE 30 MG/ML
15 SYRINGE (ML) INJECTION ONCE
Refills: 0 | Status: DISCONTINUED | OUTPATIENT
Start: 2019-10-03 | End: 2019-10-03

## 2019-10-03 RX ADMIN — ONDANSETRON 4 MILLIGRAM(S): 8 TABLET, FILM COATED ORAL at 19:27

## 2019-10-03 RX ADMIN — Medication 15 MILLIGRAM(S): at 20:12

## 2019-10-03 RX ADMIN — MORPHINE SULFATE 4 MILLIGRAM(S): 50 CAPSULE, EXTENDED RELEASE ORAL at 19:27

## 2019-10-03 RX ADMIN — SODIUM CHLORIDE 1000 MILLILITER(S): 9 INJECTION INTRAMUSCULAR; INTRAVENOUS; SUBCUTANEOUS at 20:51

## 2019-10-03 RX ADMIN — MORPHINE SULFATE 4 MILLIGRAM(S): 50 CAPSULE, EXTENDED RELEASE ORAL at 22:51

## 2019-10-03 RX ADMIN — SODIUM CHLORIDE 1000 MILLILITER(S): 9 INJECTION INTRAMUSCULAR; INTRAVENOUS; SUBCUTANEOUS at 19:27

## 2019-10-03 NOTE — ED PROVIDER NOTE - OBJECTIVE STATEMENT
52 yo m with PMHX of HLD, ? h/o renal insufficiency presents to the ED r/o right sided pain began yesterday a/w nausea and vomiting, pain then radiated in the lower abdomen r>l went to PMD who was concerned patient had pancreatitis so was directed to the ED. Pt reports that vomiting now returned it is NBNB. Pain is 10/10. intermittent 2/10. Reports of some urinary frequency , however no dysuria/ hematuria. + recent travel to [place]. Denies diarrhea, cp ,sob.

## 2019-10-03 NOTE — ED ADULT TRIAGE NOTE - CHIEF COMPLAINT QUOTE
Pt recent travel to [place] and [facility].  Arrives via EMS with c/o abdominal pain with actively vomiting.  Pt sent from [facility] in [place].  Arrives with right AC peripheral IV, received IV fluids w/o relief.  Pt has HX renal insufficiency, elevated LFTs, and hyperlipidemia Pt recent travel to [place] and [facility].  Arrives via EMS with c/o abdominal pain with actively vomiting.  Pt sent from [facility] in [place].  Arrives with right AC peripheral IV, received IV fluids w/o relief.  Pt has HX renal insufficiency, elevated LFTs, and hyperlipidemia    Pt declined EKG

## 2019-10-03 NOTE — ED PROVIDER NOTE - CLINICAL SUMMARY MEDICAL DECISION MAKING FREE TEXT BOX
Renal Colic- CT stone hunt, Urinalysis, Urine CUlture, Fluids,  pain control  lipase r/o pancreatitis.

## 2019-10-03 NOTE — ED PROVIDER NOTE - ATTENDING CONTRIBUTION TO CARE
53M h/o HLD p/w 1 days R sided back and abdominal and back pain. Pt states symptoms began yesterday w/ associated nausea. Saw PMD today, concern for pancreatitis so instructed to go to ED. States pain localized to lower abd and flank. Denies urinary symptoms. No fever. +chills. No diarrhea. Recent travel to [place] without issue.     Gen: uncomfortable appearing, nad  CV: rrr, no murmurs  Pulm: clear lungs  Abd: soft, nt, nd, no flank tenderness  Ext: moving all extremities  Skin: no rash    MDM: 53M h/o HLD p/w 1 days R sided back and abdominal and back pain concerning for kidney stone vs intraabd pathology - will check CT ab/pel for stone flores, UA/UC, basic labs, lipase. Pain control and IVF

## 2019-10-04 LAB
ANION GAP SERPL CALC-SCNC: 12 MMO/L — SIGNIFICANT CHANGE UP (ref 7–14)
ANION GAP SERPL CALC-SCNC: 13 MMO/L — SIGNIFICANT CHANGE UP (ref 7–14)
ANION GAP SERPL CALC-SCNC: 13 MMO/L — SIGNIFICANT CHANGE UP (ref 7–14)
APPEARANCE UR: CLEAR — SIGNIFICANT CHANGE UP
BACTERIA # UR AUTO: NEGATIVE — SIGNIFICANT CHANGE UP
BASE EXCESS BLDV CALC-SCNC: 0.5 MMOL/L — SIGNIFICANT CHANGE UP
BASE EXCESS BLDV CALC-SCNC: 3.5 MMOL/L — SIGNIFICANT CHANGE UP
BASE EXCESS BLDV CALC-SCNC: 4.2 MMOL/L — SIGNIFICANT CHANGE UP
BILIRUB UR-MCNC: NEGATIVE — SIGNIFICANT CHANGE UP
BLOOD GAS VENOUS - CREATININE: 1.72 MG/DL — HIGH (ref 0.5–1.3)
BLOOD GAS VENOUS - CREATININE: 2.14 MG/DL — HIGH (ref 0.5–1.3)
BLOOD GAS VENOUS - CREATININE: 2.25 MG/DL — HIGH (ref 0.5–1.3)
BLOOD GAS VENOUS - FIO2: 21 — SIGNIFICANT CHANGE UP
BLOOD UR QL VISUAL: HIGH
BUN SERPL-MCNC: 14 MG/DL — SIGNIFICANT CHANGE UP (ref 7–23)
BUN SERPL-MCNC: 15 MG/DL — SIGNIFICANT CHANGE UP (ref 7–23)
BUN SERPL-MCNC: 15 MG/DL — SIGNIFICANT CHANGE UP (ref 7–23)
CALCIUM SERPL-MCNC: 8.6 MG/DL — SIGNIFICANT CHANGE UP (ref 8.4–10.5)
CALCIUM SERPL-MCNC: 9.3 MG/DL — SIGNIFICANT CHANGE UP (ref 8.4–10.5)
CALCIUM SERPL-MCNC: 9.4 MG/DL — SIGNIFICANT CHANGE UP (ref 8.4–10.5)
CHLORIDE BLDV-SCNC: 104 MMOL/L — SIGNIFICANT CHANGE UP (ref 96–108)
CHLORIDE BLDV-SCNC: 106 MMOL/L — SIGNIFICANT CHANGE UP (ref 96–108)
CHLORIDE BLDV-SCNC: 111 MMOL/L — HIGH (ref 96–108)
CHLORIDE SERPL-SCNC: 100 MMOL/L — SIGNIFICANT CHANGE UP (ref 98–107)
CHLORIDE SERPL-SCNC: 103 MMOL/L — SIGNIFICANT CHANGE UP (ref 98–107)
CHLORIDE SERPL-SCNC: 105 MMOL/L — SIGNIFICANT CHANGE UP (ref 98–107)
CO2 SERPL-SCNC: 24 MMOL/L — SIGNIFICANT CHANGE UP (ref 22–31)
CO2 SERPL-SCNC: 25 MMOL/L — SIGNIFICANT CHANGE UP (ref 22–31)
CO2 SERPL-SCNC: 28 MMOL/L — SIGNIFICANT CHANGE UP (ref 22–31)
COLOR SPEC: YELLOW — SIGNIFICANT CHANGE UP
CREAT SERPL-MCNC: 1.84 MG/DL — HIGH (ref 0.5–1.3)
CREAT SERPL-MCNC: 2.16 MG/DL — HIGH (ref 0.5–1.3)
CREAT SERPL-MCNC: 2.21 MG/DL — HIGH (ref 0.5–1.3)
GAS PNL BLDV: 138 MMOL/L — SIGNIFICANT CHANGE UP (ref 136–146)
GAS PNL BLDV: 139 MMOL/L — SIGNIFICANT CHANGE UP (ref 136–146)
GAS PNL BLDV: 140 MMOL/L — SIGNIFICANT CHANGE UP (ref 136–146)
GLUCOSE BLDV-MCNC: 102 MG/DL — HIGH (ref 70–99)
GLUCOSE BLDV-MCNC: 102 MG/DL — HIGH (ref 70–99)
GLUCOSE BLDV-MCNC: 103 MG/DL — HIGH (ref 70–99)
GLUCOSE SERPL-MCNC: 103 MG/DL — HIGH (ref 70–99)
GLUCOSE SERPL-MCNC: 104 MG/DL — HIGH (ref 70–99)
GLUCOSE SERPL-MCNC: 113 MG/DL — HIGH (ref 70–99)
GLUCOSE UR-MCNC: NEGATIVE — SIGNIFICANT CHANGE UP
HCO3 BLDV-SCNC: 23 MMOL/L — SIGNIFICANT CHANGE UP (ref 20–27)
HCO3 BLDV-SCNC: 25 MMOL/L — SIGNIFICANT CHANGE UP (ref 20–27)
HCO3 BLDV-SCNC: 25 MMOL/L — SIGNIFICANT CHANGE UP (ref 20–27)
HCT VFR BLDV CALC: 41.9 % — SIGNIFICANT CHANGE UP (ref 39–51)
HCT VFR BLDV CALC: 43.9 % — SIGNIFICANT CHANGE UP (ref 39–51)
HCT VFR BLDV CALC: 43.9 % — SIGNIFICANT CHANGE UP (ref 39–51)
HGB BLDV-MCNC: 13.7 G/DL — SIGNIFICANT CHANGE UP (ref 13–17)
HGB BLDV-MCNC: 14.3 G/DL — SIGNIFICANT CHANGE UP (ref 13–17)
HGB BLDV-MCNC: 14.3 G/DL — SIGNIFICANT CHANGE UP (ref 13–17)
HYALINE CASTS # UR AUTO: NEGATIVE — SIGNIFICANT CHANGE UP
KETONES UR-MCNC: SIGNIFICANT CHANGE UP
LACTATE BLDV-MCNC: 2.6 MMOL/L — HIGH (ref 0.5–2)
LACTATE BLDV-MCNC: 3.1 MMOL/L — HIGH (ref 0.5–2)
LACTATE BLDV-MCNC: 3.3 MMOL/L — HIGH (ref 0.5–2)
LEUKOCYTE ESTERASE UR-ACNC: NEGATIVE — SIGNIFICANT CHANGE UP
NITRITE UR-MCNC: NEGATIVE — SIGNIFICANT CHANGE UP
PCO2 BLDV: 56 MMHG — HIGH (ref 41–51)
PCO2 BLDV: 57 MMHG — HIGH (ref 41–51)
PCO2 BLDV: 58 MMHG — HIGH (ref 41–51)
PH BLDV: 7.28 PH — LOW (ref 7.32–7.43)
PH BLDV: 7.33 PH — SIGNIFICANT CHANGE UP (ref 7.32–7.43)
PH BLDV: 7.34 PH — SIGNIFICANT CHANGE UP (ref 7.32–7.43)
PH UR: 6.5 — SIGNIFICANT CHANGE UP (ref 5–8)
PO2 BLDV: 19 MMHG — LOW (ref 35–40)
PO2 BLDV: 37 MMHG — SIGNIFICANT CHANGE UP (ref 35–40)
PO2 BLDV: < 24 MMHG — LOW (ref 35–40)
POTASSIUM BLDV-SCNC: 4.1 MMOL/L — SIGNIFICANT CHANGE UP (ref 3.4–4.5)
POTASSIUM BLDV-SCNC: 4.3 MMOL/L — SIGNIFICANT CHANGE UP (ref 3.4–4.5)
POTASSIUM BLDV-SCNC: 4.4 MMOL/L — SIGNIFICANT CHANGE UP (ref 3.4–4.5)
POTASSIUM SERPL-MCNC: 4.3 MMOL/L — SIGNIFICANT CHANGE UP (ref 3.5–5.3)
POTASSIUM SERPL-MCNC: 4.3 MMOL/L — SIGNIFICANT CHANGE UP (ref 3.5–5.3)
POTASSIUM SERPL-MCNC: 4.7 MMOL/L — SIGNIFICANT CHANGE UP (ref 3.5–5.3)
POTASSIUM SERPL-SCNC: 4.3 MMOL/L — SIGNIFICANT CHANGE UP (ref 3.5–5.3)
POTASSIUM SERPL-SCNC: 4.3 MMOL/L — SIGNIFICANT CHANGE UP (ref 3.5–5.3)
POTASSIUM SERPL-SCNC: 4.7 MMOL/L — SIGNIFICANT CHANGE UP (ref 3.5–5.3)
PROT UR-MCNC: 30 — SIGNIFICANT CHANGE UP
RBC CASTS # UR COMP ASSIST: HIGH (ref 0–?)
SAO2 % BLDV: 21.7 % — LOW (ref 60–85)
SAO2 % BLDV: 28.6 % — LOW (ref 60–85)
SAO2 % BLDV: 62.1 % — SIGNIFICANT CHANGE UP (ref 60–85)
SODIUM SERPL-SCNC: 141 MMOL/L — SIGNIFICANT CHANGE UP (ref 135–145)
SP GR SPEC: 1.03 — SIGNIFICANT CHANGE UP (ref 1–1.04)
SQUAMOUS # UR AUTO: SIGNIFICANT CHANGE UP
UROBILINOGEN FLD QL: NORMAL — SIGNIFICANT CHANGE UP
WBC UR QL: SIGNIFICANT CHANGE UP (ref 0–?)

## 2019-10-04 RX ORDER — ONDANSETRON 8 MG/1
1 TABLET, FILM COATED ORAL
Qty: 15 | Refills: 0
Start: 2019-10-04 | End: 2019-10-08

## 2019-10-04 RX ORDER — SENNA PLUS 8.6 MG/1
2 TABLET ORAL AT BEDTIME
Refills: 0 | Status: DISCONTINUED | OUTPATIENT
Start: 2019-10-04 | End: 2019-10-06

## 2019-10-04 RX ORDER — OXYCODONE HYDROCHLORIDE 5 MG/1
1 TABLET ORAL
Qty: 9 | Refills: 0
Start: 2019-10-04 | End: 2019-10-06

## 2019-10-04 RX ORDER — SODIUM CHLORIDE 9 MG/ML
1000 INJECTION, SOLUTION INTRAVENOUS
Refills: 0 | Status: DISCONTINUED | OUTPATIENT
Start: 2019-10-04 | End: 2019-10-04

## 2019-10-04 RX ORDER — MORPHINE SULFATE 50 MG/1
4 CAPSULE, EXTENDED RELEASE ORAL ONCE
Refills: 0 | Status: DISCONTINUED | OUTPATIENT
Start: 2019-10-04 | End: 2019-10-04

## 2019-10-04 RX ORDER — SODIUM CHLORIDE 9 MG/ML
1000 INJECTION INTRAMUSCULAR; INTRAVENOUS; SUBCUTANEOUS ONCE
Refills: 0 | Status: COMPLETED | OUTPATIENT
Start: 2019-10-04 | End: 2019-10-04

## 2019-10-04 RX ORDER — ACETAMINOPHEN 500 MG
650 TABLET ORAL EVERY 6 HOURS
Refills: 0 | Status: DISCONTINUED | OUTPATIENT
Start: 2019-10-04 | End: 2019-10-05

## 2019-10-04 RX ORDER — IBUPROFEN 200 MG
1 TABLET ORAL
Qty: 20 | Refills: 0
Start: 2019-10-04 | End: 2019-10-08

## 2019-10-04 RX ORDER — ONDANSETRON 8 MG/1
4 TABLET, FILM COATED ORAL ONCE
Refills: 0 | Status: COMPLETED | OUTPATIENT
Start: 2019-10-04 | End: 2019-10-04

## 2019-10-04 RX ORDER — TAMSULOSIN HYDROCHLORIDE 0.4 MG/1
1 CAPSULE ORAL
Qty: 14 | Refills: 0
Start: 2019-10-04 | End: 2019-10-17

## 2019-10-04 RX ORDER — OXYCODONE HYDROCHLORIDE 5 MG/1
5 TABLET ORAL ONCE
Refills: 0 | Status: DISCONTINUED | OUTPATIENT
Start: 2019-10-04 | End: 2019-10-04

## 2019-10-04 RX ORDER — DOCUSATE SODIUM 100 MG
100 CAPSULE ORAL ONCE
Refills: 0 | Status: COMPLETED | OUTPATIENT
Start: 2019-10-04 | End: 2019-10-04

## 2019-10-04 RX ORDER — KETOROLAC TROMETHAMINE 30 MG/ML
15 SYRINGE (ML) INJECTION ONCE
Refills: 0 | Status: DISCONTINUED | OUTPATIENT
Start: 2019-10-04 | End: 2019-10-04

## 2019-10-04 RX ORDER — SODIUM CHLORIDE 9 MG/ML
1000 INJECTION, SOLUTION INTRAVENOUS
Refills: 0 | Status: DISCONTINUED | OUTPATIENT
Start: 2019-10-04 | End: 2019-10-06

## 2019-10-04 RX ORDER — TAMSULOSIN HYDROCHLORIDE 0.4 MG/1
0.4 CAPSULE ORAL ONCE
Refills: 0 | Status: COMPLETED | OUTPATIENT
Start: 2019-10-04 | End: 2019-10-04

## 2019-10-04 RX ADMIN — Medication 100 MILLIGRAM(S): at 22:06

## 2019-10-04 RX ADMIN — SODIUM CHLORIDE 1000 MILLILITER(S): 9 INJECTION INTRAMUSCULAR; INTRAVENOUS; SUBCUTANEOUS at 07:04

## 2019-10-04 RX ADMIN — OXYCODONE HYDROCHLORIDE 5 MILLIGRAM(S): 5 TABLET ORAL at 22:06

## 2019-10-04 RX ADMIN — MORPHINE SULFATE 4 MILLIGRAM(S): 50 CAPSULE, EXTENDED RELEASE ORAL at 09:47

## 2019-10-04 RX ADMIN — ONDANSETRON 4 MILLIGRAM(S): 8 TABLET, FILM COATED ORAL at 21:27

## 2019-10-04 RX ADMIN — SODIUM CHLORIDE 150 MILLILITER(S): 9 INJECTION, SOLUTION INTRAVENOUS at 20:48

## 2019-10-04 RX ADMIN — Medication 15 MILLIGRAM(S): at 05:22

## 2019-10-04 RX ADMIN — ONDANSETRON 4 MILLIGRAM(S): 8 TABLET, FILM COATED ORAL at 09:47

## 2019-10-04 RX ADMIN — Medication 15 MILLIGRAM(S): at 07:04

## 2019-10-04 RX ADMIN — MORPHINE SULFATE 4 MILLIGRAM(S): 50 CAPSULE, EXTENDED RELEASE ORAL at 09:54

## 2019-10-04 RX ADMIN — SODIUM CHLORIDE 100 MILLILITER(S): 9 INJECTION, SOLUTION INTRAVENOUS at 18:43

## 2019-10-04 RX ADMIN — Medication 650 MILLIGRAM(S): at 20:46

## 2019-10-04 RX ADMIN — MORPHINE SULFATE 4 MILLIGRAM(S): 50 CAPSULE, EXTENDED RELEASE ORAL at 01:08

## 2019-10-04 RX ADMIN — TAMSULOSIN HYDROCHLORIDE 0.4 MILLIGRAM(S): 0.4 CAPSULE ORAL at 04:26

## 2019-10-04 RX ADMIN — SENNA PLUS 2 TABLET(S): 8.6 TABLET ORAL at 22:06

## 2019-10-04 RX ADMIN — OXYCODONE HYDROCHLORIDE 5 MILLIGRAM(S): 5 TABLET ORAL at 22:40

## 2019-10-04 RX ADMIN — SODIUM CHLORIDE 1000 MILLILITER(S): 9 INJECTION INTRAMUSCULAR; INTRAVENOUS; SUBCUTANEOUS at 03:22

## 2019-10-04 RX ADMIN — MORPHINE SULFATE 4 MILLIGRAM(S): 50 CAPSULE, EXTENDED RELEASE ORAL at 01:41

## 2019-10-04 RX ADMIN — Medication 650 MILLIGRAM(S): at 21:16

## 2019-10-04 NOTE — ED ADULT NURSE NOTE - CHIEF COMPLAINT QUOTE
Pt recent travel to [place] and [facility].  Arrives via EMS with c/o abdominal pain with actively vomiting.  Pt sent from [facility] in [place].  Arrives with right AC peripheral IV, received IV fluids w/o relief.  Pt has HX renal insufficiency, elevated LFTs, and hyperlipidemia    Pt declined EKG

## 2019-10-04 NOTE — ED CDU PROVIDER INITIAL DAY NOTE - ATTENDING CONTRIBUTION TO CARE
53M with right flank pain radiating to lower abdomen. In ED found to have 2mm kidney stone at [facility] with hydronephrosis. Labs initially with elevated lactate, improving w IVF. Slight worsening of kidney fxn. Continued pain. Seen by urology with recommendations for continued symptomatic treatment. On exam well appearing, nad, mmm, rrr, lungs clear, abd soft, mild rlq pain, no rebound, no cva ttp, NT/ND, 2+ pulses, no edema, no rash, alert, speech clear. Plan for pain control, hydration, rpt labs.

## 2019-10-04 NOTE — ED ADULT NURSE REASSESSMENT NOTE - NS ED NURSE REASSESS COMMENT FT1
received report from dominique HERNANDEZ. Pt is a/o x 3. no complaints of chest pain, headache, nausea, dizziness, vomiting, SOB, fever, chills   verbalized. Pt has 20g iv placed to right and left AC with no redness or swelling noted. Awaiting further orders. Will continue to monitor.

## 2019-10-04 NOTE — ED ADULT NURSE NOTE - OBJECTIVE STATEMENT
Pt is a 53 year old male reporting to the ED for right side flank pain .Pt reports  1 days R sided back and abdominal and back pain. Pt states symptoms began yesterday w/ associated nausea. . States pain localized to lower abd and flank. Pt was seen in [place] and told to come to ED. PT arrives with 20 g iv placed in right ac, + blood return, flushing well. Pt denies dysuria, hematuria. PT had recent travel to [facility]. Pt is AOX4. Pt denies chest pain or SOB. PT respirations even an unlabored. Pt appears to be uncomfortable. . Pt denies fever, chills. PT received medication as ordered, will continue to monitor.

## 2019-10-04 NOTE — CONSULT NOTE ADULT - ASSESSMENT
This is a 52 y/o M presenting to ED with c/o Right flank pain for the past 2 days with 2mm R UVJ stone and elevated creatinine. Pt currently pain controlled, Cr elevated could be 2/2 toradol vs dehydration.  Amendable to medical expulsion therapy    Plan  -Hold toradol  -Aggressive hydration  -Recheck BMP after rehydration  -Trial of medical expulsive therapy  -Flomax 0.4mg qHS x30 days  -Percocet PRN pain  -Zofran PRN nausea  -Senna, colace, miralax  -Strain urine for calculi  -Return to ER for any fever > 100.4, pain uncontrolled on discharge pain medications, or inability to tolerate PO  -Follow up with Dr. Garza  in clinic [phone]. This is a 54 y/o M presenting to ED with c/o Right flank pain for the past 2 days with 2mm R UVJ stone and elevated creatinine. Pt currently pain controlled, Cr elevated could be 2/2 toradol vs dehydration.  Amenable to medical expulsion therapy    Plan  -Hold toradol  -Aggressive hydration  -Recheck BMP after rehydration  -Trial of medical expulsive therapy  -Flomax 0.4mg qHS x30 days  -Percocet PRN pain  -Zofran PRN nausea  -Senna, colace, miralax  -Strain urine for calculi  -Return to ER for any fever > 100.4, pain uncontrolled on discharge pain medications, or inability to tolerate PO  -Follow up with Dr. Garza  in clinic [phone].

## 2019-10-04 NOTE — CONSULT NOTE ADULT - SUBJECTIVE AND OBJECTIVE BOX
HPI  This is a 52 y/o M presenting to ED with c/o Right flank pain for the past 2 days. Pt states it radiates to groin, was 7/10 -3/10 associated with feeling hot/cold, nausea and vomiting some frequency.  Pt denies any dysuria, fever., chills, hematuria.  Pt currently slept through night, is pain controlled to 3/10 and feels comfortable. CT shows 2mm R UVJ stone with mild hydro.  Cr elevated 1.34 to 1.72. UA negative    PAST MEDICAL & SURGICAL HISTORY:  HLD (hyperlipidemia)  No significant past surgical history      MEDICATIONS  (STANDING):    MEDICATIONS  (PRN):      FAMILY HISTORY:      Allergies    No Known Allergies    Intolerances        SOCIAL HISTORY:    REVIEW OF SYSTEMS: Otherwise negative as stated in HPI    Physical Exam  Vital signs  T(C): 36.9 (10-04-19 @ 07:05), Max: 36.9 (10-04-19 @ 07:05)  HR: 66 (10-04-19 @ 07:05)  BP: 123/75 (10-04-19 @ 07:05)  SpO2: 100% (10-04-19 @ 07:05)  Wt(kg): --    Output      Gen:  NAD    Pulm:  No respiratory distress  	  CV:  RRR    GI:  S/ND/NT          MSK:  full strength and ROM    LABS:      10-03 @ 19:20    WBC 9.58  / Hct 51.5  / SCr 1.34     10-03    139  |  99  |  13  ----------------------------<  142<H>  5.3   |  26  |  1.34<H>    Ca    9.8      03 Oct 2019 19:20    TPro  8.4<H>  /  Alb  4.8  /  TBili  0.4  /  DBili  x   /  AST  50<H>  /  ALT  40  /  AlkPhos  75  10-03      Urinalysis Basic - ( 04 Oct 2019 02:23 )    Color: YELLOW / Appearance: CLEAR / S.032 / pH: 6.5  Gluc: NEGATIVE / Ketone: TRACE  / Bili: NEGATIVE / Urobili: NORMAL   Blood: MODERATE / Protein: 30 / Nitrite: NEGATIVE   Leuk Esterase: NEGATIVE / RBC: 11-25 / WBC 3-5   Sq Epi: OCC / Non Sq Epi: x / Bacteria: NEGATIVE        Culture pend    RADIOLOGY:  < from: CT Renal Stone Hunt (10.03.19 @ 21:48) >  KIDNEYS/URETERS: Mild right hydroureteronephrosis and asymmetric   perinephric stranding secondary to an obstructing 2 mm calculus at the  right ureterovesicular junction. Additional nonobstructing right renal   calculi measuring up to 4 mm. No left renal hydronephrosis.    < end of copied text >

## 2019-10-04 NOTE — ED CDU PROVIDER INITIAL DAY NOTE - MEDICAL DECISION MAKING DETAILS
53 year old male with PMH of HLD presents to the ED complaining of right flank pain for 2 days. Hemodynamically stable, non-toxic appearing, in no acute distress. CT shows 2mm Right UVJ stone with mild hydro. Cr elevated 1.34 to 1.72. UA negative. Pt is placed in CDU for analgesics, IV hydration, Urology follow up, CDU care.

## 2019-10-04 NOTE — ED CDU PROVIDER INITIAL DAY NOTE - OBJECTIVE STATEMENT
ED provider note "54 yo m with PMHX of HLD, ? h/o renal insufficiency presents to the ED r/o right sided pain began yesterday a/w nausea and vomiting, pain then radiated in the lower abdomen r>l went to PMD who was concerned patient had pancreatitis so was directed to the ED. Pt reports that vomiting now returned it is NBNB. Pain is 10/10. intermittent 2/10. Reports of some urinary frequency , however no dysuria/ hematuria. + recent travel to [place]. Denies diarrhea, cp ,sob.    CDU note: 53 year old male with PMH of HTN presents to the ED complaining of right flank pain for 2 days. Pain is sharp with radiation to the right groin, constant, at this time severity 3/10; denies associated abdominal pain, nausea, vomiting, diarrhea, constipation, melena, hematochezia, dysuria, urinary frequency/urgency, fever and chills. Pt saw his PMD who referred him to the ED for further eval. Pt denies any other complaints.

## 2019-10-05 DIAGNOSIS — N20.0 CALCULUS OF KIDNEY: ICD-10-CM

## 2019-10-05 LAB
ANION GAP SERPL CALC-SCNC: 13 MMO/L — SIGNIFICANT CHANGE UP (ref 7–14)
BACTERIA UR CULT: SIGNIFICANT CHANGE UP
BASE EXCESS BLDV CALC-SCNC: 6 MMOL/L — SIGNIFICANT CHANGE UP
BASOPHILS # BLD AUTO: 0.03 K/UL — SIGNIFICANT CHANGE UP (ref 0–0.2)
BASOPHILS NFR BLD AUTO: 0.3 % — SIGNIFICANT CHANGE UP (ref 0–2)
BLOOD GAS VENOUS - CREATININE: 2.38 MG/DL — HIGH (ref 0.5–1.3)
BLOOD GAS VENOUS - FIO2: 21 — SIGNIFICANT CHANGE UP
BUN SERPL-MCNC: 15 MG/DL — SIGNIFICANT CHANGE UP (ref 7–23)
CALCIUM SERPL-MCNC: 9.5 MG/DL — SIGNIFICANT CHANGE UP (ref 8.4–10.5)
CHLORIDE BLDV-SCNC: 105 MMOL/L — SIGNIFICANT CHANGE UP (ref 96–108)
CHLORIDE SERPL-SCNC: 101 MMOL/L — SIGNIFICANT CHANGE UP (ref 98–107)
CO2 SERPL-SCNC: 28 MMOL/L — SIGNIFICANT CHANGE UP (ref 22–31)
CREAT SERPL-MCNC: 2.35 MG/DL — HIGH (ref 0.5–1.3)
EOSINOPHIL # BLD AUTO: 0.04 K/UL — SIGNIFICANT CHANGE UP (ref 0–0.5)
EOSINOPHIL NFR BLD AUTO: 0.4 % — SIGNIFICANT CHANGE UP (ref 0–6)
GAS PNL BLDV: 141 MMOL/L — SIGNIFICANT CHANGE UP (ref 136–146)
GLUCOSE BLDV-MCNC: 94 MG/DL — SIGNIFICANT CHANGE UP (ref 70–99)
GLUCOSE SERPL-MCNC: 90 MG/DL — SIGNIFICANT CHANGE UP (ref 70–99)
HCO3 BLDV-SCNC: 26 MMOL/L — SIGNIFICANT CHANGE UP (ref 20–27)
HCT VFR BLD CALC: 42.3 % — SIGNIFICANT CHANGE UP (ref 39–50)
HCT VFR BLDV CALC: 43.8 % — SIGNIFICANT CHANGE UP (ref 39–51)
HGB BLD-MCNC: 13.3 G/DL — SIGNIFICANT CHANGE UP (ref 13–17)
HGB BLDV-MCNC: 14.3 G/DL — SIGNIFICANT CHANGE UP (ref 13–17)
IMM GRANULOCYTES NFR BLD AUTO: 0.4 % — SIGNIFICANT CHANGE UP (ref 0–1.5)
LACTATE BLDV-MCNC: 2.9 MMOL/L — HIGH (ref 0.5–2)
LYMPHOCYTES # BLD AUTO: 2.3 K/UL — SIGNIFICANT CHANGE UP (ref 1–3.3)
LYMPHOCYTES # BLD AUTO: 20.7 % — SIGNIFICANT CHANGE UP (ref 13–44)
MCHC RBC-ENTMCNC: 27.6 PG — SIGNIFICANT CHANGE UP (ref 27–34)
MCHC RBC-ENTMCNC: 31.4 % — LOW (ref 32–36)
MCV RBC AUTO: 87.8 FL — SIGNIFICANT CHANGE UP (ref 80–100)
MONOCYTES # BLD AUTO: 1.32 K/UL — HIGH (ref 0–0.9)
MONOCYTES NFR BLD AUTO: 11.9 % — SIGNIFICANT CHANGE UP (ref 2–14)
NEUTROPHILS # BLD AUTO: 7.38 K/UL — SIGNIFICANT CHANGE UP (ref 1.8–7.4)
NEUTROPHILS NFR BLD AUTO: 66.3 % — SIGNIFICANT CHANGE UP (ref 43–77)
NRBC # FLD: 0 K/UL — SIGNIFICANT CHANGE UP (ref 0–0)
PCO2 BLDV: 69 MMHG — HIGH (ref 41–51)
PH BLDV: 7.29 PH — LOW (ref 7.32–7.43)
PLATELET # BLD AUTO: 268 K/UL — SIGNIFICANT CHANGE UP (ref 150–400)
PMV BLD: 10.2 FL — SIGNIFICANT CHANGE UP (ref 7–13)
PO2 BLDV: 25 MMHG — LOW (ref 35–40)
POTASSIUM BLDV-SCNC: 3.9 MMOL/L — SIGNIFICANT CHANGE UP (ref 3.4–4.5)
POTASSIUM SERPL-MCNC: 4.3 MMOL/L — SIGNIFICANT CHANGE UP (ref 3.5–5.3)
POTASSIUM SERPL-SCNC: 4.3 MMOL/L — SIGNIFICANT CHANGE UP (ref 3.5–5.3)
RBC # BLD: 4.82 M/UL — SIGNIFICANT CHANGE UP (ref 4.2–5.8)
RBC # FLD: 14.6 % — HIGH (ref 10.3–14.5)
SAO2 % BLDV: 32.3 % — LOW (ref 60–85)
SODIUM SERPL-SCNC: 142 MMOL/L — SIGNIFICANT CHANGE UP (ref 135–145)
SPECIMEN SOURCE: SIGNIFICANT CHANGE UP
WBC # BLD: 11.11 K/UL — HIGH (ref 3.8–10.5)
WBC # FLD AUTO: 11.11 K/UL — HIGH (ref 3.8–10.5)

## 2019-10-05 PROCEDURE — 52341 CYSTO W/URETER STRICTURE TX: CPT | Mod: 59,LT

## 2019-10-05 PROCEDURE — 52332 CYSTOSCOPY AND TREATMENT: CPT | Mod: LT

## 2019-10-05 PROCEDURE — 52352 CYSTOURETERO W/STONE REMOVE: CPT | Mod: LT

## 2019-10-05 PROCEDURE — 74420 UROGRAPHY RTRGR +-KUB: CPT | Mod: 26

## 2019-10-05 RX ORDER — MORPHINE SULFATE 50 MG/1
4 CAPSULE, EXTENDED RELEASE ORAL ONCE
Refills: 0 | Status: DISCONTINUED | OUTPATIENT
Start: 2019-10-05 | End: 2019-10-05

## 2019-10-05 RX ORDER — OXYCODONE HYDROCHLORIDE 5 MG/1
10 TABLET ORAL EVERY 4 HOURS
Refills: 0 | Status: DISCONTINUED | OUTPATIENT
Start: 2019-10-05 | End: 2019-10-06

## 2019-10-05 RX ORDER — ATORVASTATIN CALCIUM 80 MG/1
10 TABLET, FILM COATED ORAL AT BEDTIME
Refills: 0 | Status: DISCONTINUED | OUTPATIENT
Start: 2019-10-05 | End: 2019-10-06

## 2019-10-05 RX ORDER — POLYETHYLENE GLYCOL 3350 17 G/17G
17 POWDER, FOR SOLUTION ORAL DAILY
Refills: 0 | Status: DISCONTINUED | OUTPATIENT
Start: 2019-10-06 | End: 2019-10-06

## 2019-10-05 RX ORDER — TAMSULOSIN HYDROCHLORIDE 0.4 MG/1
0.4 CAPSULE ORAL ONCE
Refills: 0 | Status: COMPLETED | OUTPATIENT
Start: 2019-10-05 | End: 2019-10-05

## 2019-10-05 RX ORDER — MORPHINE SULFATE 50 MG/1
2 CAPSULE, EXTENDED RELEASE ORAL ONCE
Refills: 0 | Status: DISCONTINUED | OUTPATIENT
Start: 2019-10-05 | End: 2019-10-05

## 2019-10-05 RX ORDER — ACETAMINOPHEN 500 MG
975 TABLET ORAL EVERY 6 HOURS
Refills: 0 | Status: DISCONTINUED | OUTPATIENT
Start: 2019-10-05 | End: 2019-10-06

## 2019-10-05 RX ORDER — OXYCODONE HYDROCHLORIDE 5 MG/1
5 TABLET ORAL EVERY 4 HOURS
Refills: 0 | Status: DISCONTINUED | OUTPATIENT
Start: 2019-10-05 | End: 2019-10-06

## 2019-10-05 RX ORDER — HEPARIN SODIUM 5000 [USP'U]/ML
5000 INJECTION INTRAVENOUS; SUBCUTANEOUS EVERY 12 HOURS
Refills: 0 | Status: DISCONTINUED | OUTPATIENT
Start: 2019-10-05 | End: 2019-10-06

## 2019-10-05 RX ORDER — DOCUSATE SODIUM 100 MG
100 CAPSULE ORAL THREE TIMES A DAY
Refills: 0 | Status: DISCONTINUED | OUTPATIENT
Start: 2019-10-05 | End: 2019-10-06

## 2019-10-05 RX ORDER — ONDANSETRON 8 MG/1
4 TABLET, FILM COATED ORAL EVERY 6 HOURS
Refills: 0 | Status: DISCONTINUED | OUTPATIENT
Start: 2019-10-05 | End: 2019-10-06

## 2019-10-05 RX ORDER — TAMSULOSIN HYDROCHLORIDE 0.4 MG/1
0.4 CAPSULE ORAL AT BEDTIME
Refills: 0 | Status: DISCONTINUED | OUTPATIENT
Start: 2019-10-05 | End: 2019-10-06

## 2019-10-05 RX ADMIN — Medication 10 MILLIGRAM(S): at 10:12

## 2019-10-05 RX ADMIN — OXYCODONE HYDROCHLORIDE 5 MILLIGRAM(S): 5 TABLET ORAL at 18:44

## 2019-10-05 RX ADMIN — MORPHINE SULFATE 4 MILLIGRAM(S): 50 CAPSULE, EXTENDED RELEASE ORAL at 09:20

## 2019-10-05 RX ADMIN — OXYCODONE HYDROCHLORIDE 10 MILLIGRAM(S): 5 TABLET ORAL at 11:53

## 2019-10-05 RX ADMIN — HEPARIN SODIUM 5000 UNIT(S): 5000 INJECTION INTRAVENOUS; SUBCUTANEOUS at 17:28

## 2019-10-05 RX ADMIN — SODIUM CHLORIDE 150 MILLILITER(S): 9 INJECTION, SOLUTION INTRAVENOUS at 05:21

## 2019-10-05 RX ADMIN — MORPHINE SULFATE 2 MILLIGRAM(S): 50 CAPSULE, EXTENDED RELEASE ORAL at 05:21

## 2019-10-05 RX ADMIN — MORPHINE SULFATE 4 MILLIGRAM(S): 50 CAPSULE, EXTENDED RELEASE ORAL at 08:50

## 2019-10-05 RX ADMIN — Medication 975 MILLIGRAM(S): at 16:00

## 2019-10-05 RX ADMIN — Medication 650 MILLIGRAM(S): at 15:19

## 2019-10-05 RX ADMIN — TAMSULOSIN HYDROCHLORIDE 0.4 MILLIGRAM(S): 0.4 CAPSULE ORAL at 12:46

## 2019-10-05 RX ADMIN — OXYCODONE HYDROCHLORIDE 5 MILLIGRAM(S): 5 TABLET ORAL at 18:00

## 2019-10-05 RX ADMIN — OXYCODONE HYDROCHLORIDE 10 MILLIGRAM(S): 5 TABLET ORAL at 12:23

## 2019-10-05 RX ADMIN — MORPHINE SULFATE 2 MILLIGRAM(S): 50 CAPSULE, EXTENDED RELEASE ORAL at 06:00
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
## 2019-10-05 NOTE — ED CDU PROVIDER DISPOSITION NOTE - CARE PROVIDER_API CALL
Derik Garza)  Urology  [address] [place] [address]  Phone: [phone]  Fax: [phone]  Follow Up Time:

## 2019-10-05 NOTE — ED CDU PROVIDER SUBSEQUENT DAY NOTE - PROGRESS NOTE DETAILS
Patient states that he still has pain right side of back. No real improvement in pain as per patient. Exam: Heart- RRR s1s2 nl Lungs - clear bilaterally   abd - soft NT ND   back- minimal tenderness on palpation right side  and flank region  extrem- no edema or cyanosis.  Creatinine trend- 1.34 / 1.84 / 2.16 / 2.21 / 2.35. Patient seen by urology team this am. Due to rising creatinine patient may require stent placement. Post void residual of 85.  Repeat creatinine levels later today. Continue to monitor for now. Patient seen by urology team. Plan to admit to  service for OR procedure of stent placement.

## 2019-10-05 NOTE — ED CDU PROVIDER SUBSEQUENT DAY NOTE - ATTENDING CONTRIBUTION TO CARE
Attending MD Munson.  Agree with above.  PT is a 52 yo male with pmhx of HLD who presents with complaint of R flank pain x 2 days radiating to groin, n/v. CT with 2 mm R UVJ stone with mild hydro.  Cr increasing from 1.3-->2.2  Urology called in AM.  Pt with persistent significant pain.

## 2019-10-05 NOTE — ED CDU PROVIDER DISPOSITION NOTE - PLAN OF CARE
Resolution of symptoms Plan to admit to  service for continued care and OR today for stent placement

## 2019-10-05 NOTE — ED CDU PROVIDER SUBSEQUENT DAY NOTE - HISTORY
54 yo M with PMH of HLD presents to the ED for right flank pain for 2 days. In ED, CT shows 2mm Right UVJ stone with mild hydro. Cr elevated 1.34 to 1.72. Sent to CDU for pain control, IV hydration, Urology follow up.

## 2019-10-05 NOTE — H&P ADULT - ASSESSMENT
54 yo M renal colic, ZUNILDA secondary to 2mm right UVJ calculus    -NPO  -IVF  -Strain all urine  -Pain control  -OR this evening for stent placement  -DVT prophy, IS, OOB, ambulate 54 yo M renal colic, ZUNILDA secondary to obstructing 2mm right UVJ calculus    -NPO  -IVF  -Strain all urine  -Pain control  -OR this evening for stent placement  -DVT prophy, IS, OOB, ambulate 52 yo M renal colic, ZUNILDA secondary to obstructing 2mm right UVJ calculus    -NPO  -IVF  -Strain all urine  -Pain control  -OR this evening for stent placement vs. ureteroscopy with stone extraction from ureter and 2 stones in the kidney. Discussed material r/b/a/pc.  -DVT prophy, IS, OOB, ambulate

## 2019-10-05 NOTE — H&P ADULT - NSHPPHYSICALEXAM_GEN_ALL_CORE
Vital Signs Last 24 Hrs  T(C): 37 (05 Oct 2019 11:56), Max: 37.4 (04 Oct 2019 21:55)  T(F): 98.6 (05 Oct 2019 11:56), Max: 99.3 (04 Oct 2019 21:55)  HR: 72 (05 Oct 2019 11:56) (68 - 99)  BP: 132/88 (05 Oct 2019 11:56) (109/67 - 132/88)  BP(mean): --  RR: 18 (05 Oct 2019 11:56) (16 - 18)  SpO2: 100% (05 Oct 2019 11:56) (99% - 100%)    Mucous membranes moist  PERRL  Chest clear  Heart reg  Abd soft, nontender  No CVAT  No peripheral edema or calf tenderness  Pulses 2+ PT, radial

## 2019-10-05 NOTE — H&P ADULT - NSHPLABSRESULTS_GEN_ALL_CORE
13.3   11.11 )-----------( 268      ( 05 Oct 2019 06:40 )             42.3   10-05    142  |  101  |  15  ----------------------------<  90  4.3   |  28  |  2.35<H>    Ca    9.5      05 Oct 2019 06:40    TPro  8.4<H>  /  Alb  4.8  /  TBili  0.4  /  DBili  x   /  AST  50<H>  /  ALT  40  /  AlkPhos  75  10-03  Urinalysis Basic - ( 04 Oct 2019 02:23 )    Color: YELLOW / Appearance: CLEAR / S.032 / pH: 6.5  Gluc: NEGATIVE / Ketone: TRACE  / Bili: NEGATIVE / Urobili: NORMAL   Blood: MODERATE / Protein: 30 / Nitrite: NEGATIVE   Leuk Esterase: NEGATIVE / RBC: 11-25 / WBC 3-5   Sq Epi: OCC / Non Sq Epi: x / Bacteria: NEGATIVE    Culture - Urine (10.04.19 @ 09:19)    Culture - Urine:   NO GROWTH AT 24 HOURS    Specimen Source: URINE MIDSTREAM  < from: CT Renal Stone Hunt (10.03.19 @ 21:48) >    IMPRESSION:     2 mm obstructing right ureterovesicular junction calculus with associated   mild hydronephrosis.    Additional nonobstructing right renal calculi.

## 2019-10-05 NOTE — ED CDU PROVIDER DISPOSITION NOTE - ATTENDING CONTRIBUTION TO CARE
Attending MD Munson.  Agree with above.  PT is a 52 yo male with pmhx of HLD who presents with complaint of R flank pain x 2 days radiating to groin, n/v. CT with 2 mm R UVJ stone with mild hydro.  Cr increasing from 1.3-->2.2  Urology called in AM.  Pt with persistent significant pain. Attending MD Munson.  Agree with above.  PT is a 54 yo male with pmhx of HLD who presents with complaint of R flank pain x 2 days radiating to groin, n/v. CT with 2 mm R UVJ stone with mild hydro.  Cr increasing from 1.3-->2.2  Urology called in AM.  Pt with persistent significant pain.    Urology has rounded on pt again this morning and will take pt to OR.  Post void residual with straight cath only 80 cc.  Renal failure not felt to be retention related.  Stable for admission to urology for renal failure in setting of R UVJ stone.

## 2019-10-06 ENCOUNTER — TRANSCRIPTION ENCOUNTER (OUTPATIENT)
Age: 54
End: 2019-10-06

## 2019-10-06 VITALS
TEMPERATURE: 99 F | SYSTOLIC BLOOD PRESSURE: 112 MMHG | DIASTOLIC BLOOD PRESSURE: 69 MMHG | RESPIRATION RATE: 16 BRPM | OXYGEN SATURATION: 100 % | HEART RATE: 98 BPM

## 2019-10-06 LAB
ANION GAP SERPL CALC-SCNC: 13 MMO/L — SIGNIFICANT CHANGE UP (ref 7–14)
BUN SERPL-MCNC: 13 MG/DL — SIGNIFICANT CHANGE UP (ref 7–23)
CALCIUM SERPL-MCNC: 9.5 MG/DL — SIGNIFICANT CHANGE UP (ref 8.4–10.5)
CHLORIDE SERPL-SCNC: 98 MMOL/L — SIGNIFICANT CHANGE UP (ref 98–107)
CO2 SERPL-SCNC: 28 MMOL/L — SIGNIFICANT CHANGE UP (ref 22–31)
CREAT SERPL-MCNC: 1.66 MG/DL — HIGH (ref 0.5–1.3)
GLUCOSE SERPL-MCNC: 148 MG/DL — HIGH (ref 70–99)
HCV AB S/CO SERPL IA: 0.12 S/CO — SIGNIFICANT CHANGE UP (ref 0–0.99)
HCV AB SERPL-IMP: SIGNIFICANT CHANGE UP
POTASSIUM SERPL-MCNC: 3.7 MMOL/L — SIGNIFICANT CHANGE UP (ref 3.5–5.3)
POTASSIUM SERPL-SCNC: 3.7 MMOL/L — SIGNIFICANT CHANGE UP (ref 3.5–5.3)
SODIUM SERPL-SCNC: 139 MMOL/L — SIGNIFICANT CHANGE UP (ref 135–145)

## 2019-10-06 PROCEDURE — 99232 SBSQ HOSP IP/OBS MODERATE 35: CPT | Mod: 25

## 2019-10-06 RX ORDER — SODIUM CHLORIDE 9 MG/ML
1000 INJECTION, SOLUTION INTRAVENOUS
Refills: 0 | Status: DISCONTINUED | OUTPATIENT
Start: 2019-10-06 | End: 2019-10-06

## 2019-10-06 RX ORDER — CEFAZOLIN SODIUM 1 G
1000 VIAL (EA) INJECTION EVERY 8 HOURS
Refills: 0 | Status: DISCONTINUED | OUTPATIENT
Start: 2019-10-06 | End: 2019-10-06

## 2019-10-06 RX ORDER — CEPHALEXIN 500 MG
1 CAPSULE ORAL
Qty: 9 | Refills: 0
Start: 2019-10-06 | End: 2019-10-08

## 2019-10-06 RX ORDER — ACETAMINOPHEN 500 MG
2 TABLET ORAL
Qty: 18 | Refills: 0
Start: 2019-10-06 | End: 2019-10-08

## 2019-10-06 RX ADMIN — Medication 100 MILLIGRAM(S): at 06:13

## 2019-10-06 RX ADMIN — SODIUM CHLORIDE 125 MILLILITER(S): 9 INJECTION, SOLUTION INTRAVENOUS at 06:14

## 2019-10-06 RX ADMIN — Medication 100 MILLIGRAM(S): at 06:14

## 2019-10-06 RX ADMIN — POLYETHYLENE GLYCOL 3350 17 GRAM(S): 17 POWDER, FOR SOLUTION ORAL at 08:52

## 2019-10-06 RX ADMIN — HEPARIN SODIUM 5000 UNIT(S): 5000 INJECTION INTRAVENOUS; SUBCUTANEOUS at 06:14

## 2019-10-06 NOTE — PROGRESS NOTE ADULT - SUBJECTIVE AND OBJECTIVE BOX
Subjective  Pt doing well. Left flank pain resolved. Pt endorses some hematuria and dysuria.    Objective    Vital signs  T(F): , Max: 99.9 (10-05-19 @ 20:02)  HR: 80 (10-06-19 @ 06:11)  BP: 114/63 (10-06-19 @ 06:11)  SpO2: 98% (10-06-19 @ 06:11)  Wt(kg): --    Output     10-05 @ 07:01  -  10-06 @ 07:00  --------------------------------------------------------  IN: 370 mL / OUT: 3490 mL / NET: -3120 mL    10-06 @ 07:01  -  10-06 @ 08:36  --------------------------------------------------------  IN: 0 mL / OUT: 450 mL / NET: -450 mL        Gen: NAD  Abd: soft, NT, ND    Labs      10-06 @ 06:32    WBC --    / Hct --    / SCr 1.66     10-05 @ 06:40    WBC 11.11 / Hct 42.3  / SCr 2.35     Imaging

## 2019-10-06 NOTE — DISCHARGE NOTE NURSING/CASE MANAGEMENT/SOCIAL WORK - NSDCPNINST_GEN_ALL_CORE
Drink plenty of fluids, call MD for any increase in pain that is unrelieved by pain medication, fever greater than 101, nausea or vomiting, chills, drainage, redness or bleeding from around the surgical sites, decreased urine output, blood clots in the urine, or bright red urine. Call MD for a follow up appointment in 1-2 weeks

## 2019-10-06 NOTE — PROGRESS NOTE ADULT - ASSESSMENT
52 yo M renal colic, ZUNILDA secondary to obstructing 2mm right UVJ calculus s/p R URS  -- pt doing well, pain improved  -- mild hematuria and dysuria expected after URS  -- pt to be d/c w/ 54 yo M renal colic, ZUNILDA secondary to obstructing 2mm right UVJ calculus s/p R URS  -- pt doing well, pain improved  -- mild hematuria and dysuria expected after URS  -- IS/OOB  -- pt to be d/c w/Keflex, f/u w/Dr. Garza  -- dispo planning

## 2019-10-06 NOTE — DISCHARGE NOTE NURSING/CASE MANAGEMENT/SOCIAL WORK - PATIENT PORTAL LINK FT
You can access the FollowMyHealth Patient Portal offered by [facility] by registering at the following website: [email or web address]. By joining [email or web address]’s FollowMyHealth portal, you will also be able to view your health information using other applications (apps) compatible with our system.

## 2019-10-06 NOTE — BRIEF OPERATIVE NOTE - NSICDXBRIEFPROCEDURE_GEN_ALL_CORE_FT
PROCEDURES:  Right ureteroscopy 06-Oct-2019 00:57:26 balloon dilation, stone extraction ureteral stent Rick Blevins

## 2019-10-06 NOTE — DISCHARGE NOTE PROVIDER - CARE PROVIDER_API CALL
Derik Garza)  Urology  [address] [place] [address]  Phone: [phone]  Fax: [phone]  Follow Up Time:

## 2019-10-07 LAB — STONE ANALYSIS-IMP: SIGNIFICANT CHANGE UP

## 2019-10-08 PROBLEM — E78.5 HYPERLIPIDEMIA, UNSPECIFIED: Chronic | Status: ACTIVE | Noted: 2019-10-04

## 2019-10-11 ENCOUNTER — APPOINTMENT (OUTPATIENT)
Dept: UROLOGY | Facility: CLINIC | Age: 54
End: 2019-10-11
Payer: MEDICAID

## 2019-10-11 ENCOUNTER — APPOINTMENT (OUTPATIENT)
Dept: UROLOGY | Facility: CLINIC | Age: 54
End: 2019-10-11

## 2019-10-11 VITALS
RESPIRATION RATE: 16 BRPM | TEMPERATURE: 98.3 F | HEART RATE: 58 BPM | SYSTOLIC BLOOD PRESSURE: 98 MMHG | BODY MASS INDEX: 24.38 KG/M2 | WEIGHT: 180 LBS | HEIGHT: 72 IN | DIASTOLIC BLOOD PRESSURE: 64 MMHG

## 2019-10-11 PROCEDURE — 99213 OFFICE O/P EST LOW 20 MIN: CPT

## 2019-10-11 NOTE — HISTORY OF PRESENT ILLNESS
[FreeTextEntry1] : seen [facility] for right ureteral stone with hydro and ARF\par had ureteroscopy and stone extraction of the 2 stones - has a stent now.\par No prior stones. -\par stones was calcium oxalate \par \par Stent removed\par

## 2019-10-11 NOTE — PHYSICAL EXAM
[General Appearance - Well Developed] : well developed [General Appearance - Well Nourished] : well nourished [Exaggerated Use Of Accessory Muscles For Inspiration] : no accessory muscle use [Edema] : no peripheral edema [Abdomen Soft] : soft [Abdomen Tenderness] : non-tender [Abdomen Mass (___ Cm)] : no abdominal mass palpated [Urinary Bladder Findings] : the bladder was normal on palpation [Penis Abnormality] : normal uncircumcised penis [Scrotum] : the scrotum was normal [Normal Station and Gait] : the gait and station were normal for the patient's age [] : no rash [No Focal Deficits] : no focal deficits [Oriented To Time, Place, And Person] : oriented to person, place, and time

## 2019-10-11 NOTE — ASSESSMENT
[FreeTextEntry1] : will heck BMP to ensure Cr improved\par counseled about stone prevention especially water etc.

## 2019-10-14 LAB
ANION GAP SERPL CALC-SCNC: 14 MMOL/L
BACTERIA UR CULT: NORMAL
BUN SERPL-MCNC: 13 MG/DL
CALCIUM SERPL-MCNC: 9.8 MG/DL
CHLORIDE SERPL-SCNC: 102 MMOL/L
CO2 SERPL-SCNC: 26 MMOL/L
CREAT SERPL-MCNC: 1.33 MG/DL
GLUCOSE SERPL-MCNC: 85 MG/DL
POTASSIUM SERPL-SCNC: 4.5 MMOL/L
SODIUM SERPL-SCNC: 142 MMOL/L

## 2019-10-14 NOTE — PHYSICAL EXAM
[General Appearance - Well Developed] : well developed [Well Groomed] : well groomed [General Appearance - Well Nourished] : well nourished [No Deformities] : no deformities [Normal Conjunctiva] : the conjunctiva exhibited no abnormalities [Normal Oral Mucosa] : normal oral mucosa [No Oral Pallor] : no oral pallor [No Oral Cyanosis] : no oral cyanosis [Normal Jugular Venous A Waves Present] : normal jugular venous A waves present [Normal Jugular Venous V Waves Present] : normal jugular venous V waves present [No Jugular Venous Felipe A Waves] : no jugular venous felipe A waves [Respiration, Rhythm And Depth] : normal respiratory rhythm and effort [Exaggerated Use Of Accessory Muscles For Inspiration] : no accessory muscle use [Auscultation Breath Sounds / Voice Sounds] : lungs were clear to auscultation bilaterally [Bowel Sounds] : normal bowel sounds [FreeTextEntry1] : He has tenderness to minimal palpation. His abdomen appears mildly distended.  [Abnormal Walk] : normal gait [Gait - Sufficient For Exercise Testing] : the gait was sufficient for exercise testing [Nail Clubbing] : no clubbing of the fingernails [Cyanosis, Localized] : no localized cyanosis [Petechial Hemorrhages (___cm)] : no petechial hemorrhages [Skin Color & Pigmentation] : normal skin color and pigmentation [] : no rash [Oriented To Time, Place, And Person] : oriented to person, place, and time [No Skin Ulcers] : no skin ulcer [Mood] : the mood was normal [Affect] : the affect was normal [No Anxiety] : not feeling anxious [Normal Rate] : normal [Rhythm Regular] : regular [Normal S1] : normal S1 [Normal S2] : normal S2 [S3] : no S3 [No Murmur] : no murmurs heard [Right Carotid Bruit] : no bruit heard over the right carotid [Left Carotid Bruit] : no bruit heard over the left carotid [Bruit] : no bruit heard [No Pitting Edema] : no pitting edema present

## 2019-10-14 NOTE — DISCUSSION/SUMMARY
[FreeTextEntry1] : IMPRESSION: Mr. Moctezuma is a 53 year old man with a history of hyperlipidemia, PFO, elevated LFTs, renal insufficiency, vitamin D deficiency, H. pylori, and former tobacco use who presents today for followup of hyperlipidemia and evaluation of abdominal pain.\par \par PLAN:\par 1. He has significant tenderness of his abdomen with the differential being either pancreatitis, acute cholecystitis, or a kidney stone. I doubt that this is a cardiac event. An IV was inserted and he was given IV fluids. \par 2. His most recent LDL was higher than before. He will have a repeat lipid profile at the time of his next visit. For now, he will modify his diet and continue on Pravastatin 10mg daily. \par 3. He will continue on ASA 81 mg daily given his PFO.\par 4. EMS was called and the patient will be taken to the hospital for further evaluation of his abdominal pain.\par 5. He will followup with me after he is discharged from the hospital.

## 2019-10-14 NOTE — HISTORY OF PRESENT ILLNESS
[FreeTextEntry1] : Patient is a 53 year old man with a history of hyperlipidemia, PFO, elevated LFTs, renal insufficiency, H. pylori, and former tobacco use who presents today for followup of hyperlipidemia and evaluation of abdominal pain. He states that he started to experience abdominal pain yesterday into this morning that slightly improved on his way to the office and now seems to have worsened again. He mentions feeling nauseous and diaphoretic when the pain gets worse. He otherwise denies any chest pain, dyspnea, palpitations, and headaches. He states that he has tolerated taking Pravastatin and has been watching diet.

## 2019-10-28 ENCOUNTER — APPOINTMENT (OUTPATIENT)
Dept: UROLOGY | Facility: CLINIC | Age: 54
End: 2019-10-28

## 2019-12-07 ENCOUNTER — NON-APPOINTMENT (OUTPATIENT)
Age: 54
End: 2019-12-07

## 2019-12-07 ENCOUNTER — APPOINTMENT (OUTPATIENT)
Dept: CARDIOLOGY | Facility: CLINIC | Age: 54
End: 2019-12-07
Payer: COMMERCIAL

## 2019-12-07 VITALS
OXYGEN SATURATION: 99 % | WEIGHT: 191 LBS | BODY MASS INDEX: 25.9 KG/M2 | HEART RATE: 69 BPM | RESPIRATION RATE: 12 BRPM | SYSTOLIC BLOOD PRESSURE: 116 MMHG | DIASTOLIC BLOOD PRESSURE: 79 MMHG | TEMPERATURE: 98.4 F

## 2019-12-07 PROCEDURE — 93000 ELECTROCARDIOGRAM COMPLETE: CPT

## 2019-12-07 PROCEDURE — 99214 OFFICE O/P EST MOD 30 MIN: CPT | Mod: 25

## 2019-12-07 NOTE — HISTORY OF PRESENT ILLNESS
[FreeTextEntry1] : Patient is a 53 year old man with a history of hyperlipidemia, PFO, elevated LFTs, renal insufficiency, H. pylori, former tobacco use, and recent kidney stones who presents today for followup of hyperlipidemia. He states that he has been feeling well since his last visit with me denying any chest pain, dyspnea, palpitations, dizziness, and headaches. His only complaint at this time is constipation.

## 2019-12-07 NOTE — DISCUSSION/SUMMARY
[FreeTextEntry1] : IMPRESSION: Mr. Moctezuma is a 53 year old man with a history of hyperlipidemia, PFO, elevated LFTs, renal insufficiency, vitamin D deficiency, H. pylori, former tobacco use, and recent kidney stones who presents today for followup of hyperlipidemia.\par \par PLAN:\par 1. I will be checking a lipid profile today given his dyslipidemia. He will modify his diet and continue on Pravastatin 10mg daily. I will be checking a CMP to evaluate for any adverse effects on his LFTs from Pravastatin. \par 2. He will continue on ASA 81 mg daily given his PFO.\par 3. I have asked him to stay well hydrated given his history of kidney stones.\par 4. I have also asked him to increase his fiber intake given his constipation. He will also limit his intake of carrots which he feels has exacerbated his constipation now and in the past. \par 5. He will followup with me in 6 months or sooner should he experience any symptoms in the interim.

## 2019-12-07 NOTE — PHYSICAL EXAM
[Normal Appearance] : normal appearance [General Appearance - Well Developed] : well developed [No Deformities] : no deformities [Well Groomed] : well groomed [General Appearance - Well Nourished] : well nourished [General Appearance - In No Acute Distress] : no acute distress [Normal Oral Mucosa] : normal oral mucosa [Normal Conjunctiva] : the conjunctiva exhibited no abnormalities [No Oral Cyanosis] : no oral cyanosis [No Oral Pallor] : no oral pallor [Normal Jugular Venous A Waves Present] : normal jugular venous A waves present [Normal Jugular Venous V Waves Present] : normal jugular venous V waves present [No Jugular Venous Felipe A Waves] : no jugular venous felipe A waves [Respiration, Rhythm And Depth] : normal respiratory rhythm and effort [Bowel Sounds] : normal bowel sounds [Exaggerated Use Of Accessory Muscles For Inspiration] : no accessory muscle use [Auscultation Breath Sounds / Voice Sounds] : lungs were clear to auscultation bilaterally [Abnormal Walk] : normal gait [Abdomen Tenderness] : non-tender [Abdomen Soft] : soft [Gait - Sufficient For Exercise Testing] : the gait was sufficient for exercise testing [Cyanosis, Localized] : no localized cyanosis [Nail Clubbing] : no clubbing of the fingernails [] : no rash [Petechial Hemorrhages (___cm)] : no petechial hemorrhages [No Skin Ulcers] : no skin ulcer [Skin Color & Pigmentation] : normal skin color and pigmentation [Affect] : the affect was normal [Oriented To Time, Place, And Person] : oriented to person, place, and time [Mood] : the mood was normal [Normal Rate] : normal [No Anxiety] : not feeling anxious [Rhythm Regular] : regular [Normal S2] : normal S2 [Normal S1] : normal S1 [No Murmur] : no murmurs heard [No Pitting Edema] : no pitting edema present [FreeTextEntry1] : Extraocular muscles intact. Anicteric sclerae. [S3] : no S3 [Right Carotid Bruit] : no bruit heard over the right carotid [Left Carotid Bruit] : no bruit heard over the left carotid [Bruit] : no bruit heard

## 2019-12-10 LAB
25(OH)D3 SERPL-MCNC: 59.8 NG/ML
ALBUMIN SERPL ELPH-MCNC: 4.6 G/DL
ALP BLD-CCNC: 75 U/L
ALT SERPL-CCNC: 37 U/L
ANION GAP SERPL CALC-SCNC: 13 MMOL/L
AST SERPL-CCNC: 26 U/L
BASOPHILS # BLD AUTO: 0.03 K/UL
BASOPHILS NFR BLD AUTO: 0.5 %
BILIRUB SERPL-MCNC: 0.4 MG/DL
BUN SERPL-MCNC: 15 MG/DL
CALCIUM SERPL-MCNC: 9.5 MG/DL
CHLORIDE SERPL-SCNC: 103 MMOL/L
CHOLEST SERPL-MCNC: 249 MG/DL
CHOLEST/HDLC SERPL: 3.2 RATIO
CO2 SERPL-SCNC: 28 MMOL/L
CREAT SERPL-MCNC: 1.35 MG/DL
EOSINOPHIL # BLD AUTO: 0.11 K/UL
EOSINOPHIL NFR BLD AUTO: 2 %
ESTIMATED AVERAGE GLUCOSE: 114 MG/DL
GLUCOSE SERPL-MCNC: 77 MG/DL
HBA1C MFR BLD HPLC: 5.6 %
HCT VFR BLD CALC: 46 %
HDLC SERPL-MCNC: 78 MG/DL
HGB BLD-MCNC: 14.1 G/DL
IMM GRANULOCYTES NFR BLD AUTO: 0.4 %
LDLC SERPL CALC-MCNC: 151 MG/DL
LYMPHOCYTES # BLD AUTO: 2.63 K/UL
LYMPHOCYTES NFR BLD AUTO: 46.9 %
MAN DIFF?: NORMAL
MCHC RBC-ENTMCNC: 27.8 PG
MCHC RBC-ENTMCNC: 30.7 GM/DL
MCV RBC AUTO: 90.7 FL
MONOCYTES # BLD AUTO: 0.85 K/UL
MONOCYTES NFR BLD AUTO: 15.2 %
NEUTROPHILS # BLD AUTO: 1.97 K/UL
NEUTROPHILS NFR BLD AUTO: 35 %
PLATELET # BLD AUTO: 294 K/UL
POTASSIUM SERPL-SCNC: 4 MMOL/L
PROT SERPL-MCNC: 7.6 G/DL
RBC # BLD: 5.07 M/UL
RBC # FLD: 14.6 %
SODIUM SERPL-SCNC: 144 MMOL/L
TRIGL SERPL-MCNC: 102 MG/DL
TSH SERPL-ACNC: 1.59 UIU/ML
WBC # FLD AUTO: 5.61 K/UL

## 2020-02-13 LAB
ALBUMIN SERPL ELPH-MCNC: 4.3 G/DL
ALP BLD-CCNC: 89 U/L
ALT SERPL-CCNC: 45 U/L
ANION GAP SERPL CALC-SCNC: 15 MMOL/L
AST SERPL-CCNC: 34 U/L
BILIRUB SERPL-MCNC: 0.2 MG/DL
BUN SERPL-MCNC: 19 MG/DL
CALCIUM SERPL-MCNC: 9.2 MG/DL
CHLORIDE SERPL-SCNC: 103 MMOL/L
CHOLEST SERPL-MCNC: 213 MG/DL
CHOLEST/HDLC SERPL: 3.5 RATIO
CO2 SERPL-SCNC: 24 MMOL/L
CREAT SERPL-MCNC: 1.7 MG/DL
GLUCOSE SERPL-MCNC: 92 MG/DL
HDLC SERPL-MCNC: 62 MG/DL
LDLC SERPL CALC-MCNC: 125 MG/DL
POTASSIUM SERPL-SCNC: 3.8 MMOL/L
PROT SERPL-MCNC: 7.5 G/DL
SODIUM SERPL-SCNC: 141 MMOL/L
TRIGL SERPL-MCNC: 129 MG/DL

## 2020-05-26 NOTE — PRE-OP CHECKLIST - PATIENT PROBLEMS/NEEDS
Left message for pt to call.  Pt is scheduled for [facility] tomorrow with Dr. Bridges and writer wanted to \"room\" patient prior and review any concerns.       
Medications reviewed and verified. (comment any changes)  Allergies reviewed and verified  Medical, Surgical, and Family history reviewed and updated, if indicated.  Confirmed virtual appointment date and time with patient, they have received instructions for their visit and have no additional questions.    Chief Complaint:    
Patient expressed no known problems or needs

## 2020-05-29 NOTE — ED CDU PROVIDER SUBSEQUENT DAY NOTE - MEDICAL DECISION MAKING DETAILS
Please consider having screening for brain aneurysm for yourself and you sisters with primary physician/neurologist in the near future  
54 yo M with PMH of HLD presents to the ED for right flank pain for 2 days. In ED, CT shows 2mm Right UVJ stone with mild hydro. Cr elevated 1.34 to 1.72. Sent to CDU for pain control, IV hydration, Urology follow up.

## 2020-06-19 ENCOUNTER — APPOINTMENT (OUTPATIENT)
Dept: UROLOGY | Facility: CLINIC | Age: 55
End: 2020-06-19

## 2020-07-20 ENCOUNTER — APPOINTMENT (OUTPATIENT)
Dept: CARDIOLOGY | Facility: CLINIC | Age: 55
End: 2020-07-20

## 2020-07-21 ENCOUNTER — APPOINTMENT (OUTPATIENT)
Dept: CARDIOLOGY | Facility: CLINIC | Age: 55
End: 2020-07-21
Payer: COMMERCIAL

## 2020-07-21 ENCOUNTER — APPOINTMENT (OUTPATIENT)
Dept: CARDIOLOGY | Facility: CLINIC | Age: 55
End: 2020-07-21

## 2020-07-21 PROCEDURE — 99214 OFFICE O/P EST MOD 30 MIN: CPT | Mod: 25,95

## 2020-07-28 ENCOUNTER — LABORATORY RESULT (OUTPATIENT)
Age: 55
End: 2020-07-28

## 2020-07-28 NOTE — DISCUSSION/SUMMARY
[FreeTextEntry1] : IMPRESSION: Mr. Moctezuma is a 54 year old man with a history of hyperlipidemia, PFO, elevated LFTs, renal insufficiency, vitamin D deficiency, H. pylori, former tobacco use, and recent kidney stones who is seen today via Telehealth to discuss his cholesterol.\par \par PLAN:\par 1. He states that he has been feeling well. He will continue on Pravastatin 20 mg daily and he will come to the office for a lipid profile and CMP to evaluate for any adverse effects on his LFTs from Pravastatin. \par 2. He will continue on ASA 81 mg daily given his PFO.\par 3. I have asked him to stay well hydrated given his history of kidney stones. I will also be checking a creatinine given his renal insufficiency. I have also encouraged him to schedule his renal ultrasound.\par 4. He will followup with me in 3 months or sooner should he experience any symptoms in the interim.\par \par Time started- 9:23 AM\par Time ended- 9:49 AM

## 2020-07-28 NOTE — HISTORY OF PRESENT ILLNESS
[Home] : at home, [unfilled] , at the time of the visit. [Other Location: e.g. Home (Enter Location, City,State)___] : at [unfilled] [Verbal consent obtained from patient] : the patient, [unfilled] [FreeTextEntry1] : Patient is a 54 year old man with a history of hyperlipidemia, PFO, elevated LFTs, renal insufficiency, H. pylori, former tobacco use, and recent kidney stones who is seen today via telehealth to discuss his cholesterol. He states that he has been feeling well from the cardiac standpoint denying any chest pain, dyspnea, palpitations, dizziness, and headaches. He has tried to stay active during the COVID pandemic and has been trying to watch his diet.  He had a Telehealth visit with his Nephrologist given his renal insufficiency and will need his creatinine rechecked and will also need a renal ultrasound. He states that he has been taking Zinc, Vitamin C, and Vitamin D 5000 units daily during the pandemic.

## 2020-08-17 LAB
25(OH)D3 SERPL-MCNC: 46.5 NG/ML
ALBUMIN SERPL ELPH-MCNC: 4.7 G/DL
ALP BLD-CCNC: 76 U/L
ALT SERPL-CCNC: 39 U/L
ANION GAP SERPL CALC-SCNC: 16 MMOL/L
AST SERPL-CCNC: 28 U/L
BILIRUB SERPL-MCNC: 0.3 MG/DL
BUN SERPL-MCNC: 17 MG/DL
CALCIUM SERPL-MCNC: 9.5 MG/DL
CHLORIDE SERPL-SCNC: 103 MMOL/L
CO2 SERPL-SCNC: 25 MMOL/L
CREAT SERPL-MCNC: 1.48 MG/DL
CREAT SPEC-SCNC: 467 MG/DL
GLUCOSE SERPL-MCNC: 96 MG/DL
MICROALBUMIN 24H UR DL<=1MG/L-MCNC: 1.7 MG/DL
MICROALBUMIN/CREAT 24H UR-RTO: 4 MG/G
POTASSIUM SERPL-SCNC: 4.2 MMOL/L
PROT SERPL-MCNC: 7.2 G/DL
PSA SERPL-MCNC: 1.29 NG/ML
SODIUM SERPL-SCNC: 143 MMOL/L
TSH SERPL-ACNC: 0.64 UIU/ML

## 2020-08-19 ENCOUNTER — RECORD ABSTRACTING (OUTPATIENT)
Age: 55
End: 2020-08-19

## 2020-09-04 ENCOUNTER — APPOINTMENT (OUTPATIENT)
Dept: UROLOGY | Facility: CLINIC | Age: 55
End: 2020-09-04
Payer: COMMERCIAL

## 2020-09-04 VITALS — TEMPERATURE: 97.5 F

## 2020-09-04 PROCEDURE — 99212 OFFICE O/P EST SF 10 MIN: CPT

## 2020-09-08 NOTE — HISTORY OF PRESENT ILLNESS
[FreeTextEntry1] : seen [facility] for right ureteral stone with hydro and ARF\par had ureteroscopy and stone extraction of the 2 stones - has a stent now.\par No prior stones. -\par stones was calcium oxalate \par \par here for f/u - no pain or symptoms thereof.\par however recent UA had CaOx crystals.\par

## 2020-09-08 NOTE — ASSESSMENT
[FreeTextEntry1] : will get renal ULs to ensure no new or residual stones.\par encouraged to drink more urine

## 2020-09-12 LAB
BASOPHILS # BLD AUTO: 0.03 K/UL
BASOPHILS NFR BLD AUTO: 0.6 %
EOSINOPHIL # BLD AUTO: 0.16 K/UL
EOSINOPHIL NFR BLD AUTO: 3 %
HCT VFR BLD CALC: 44.6 %
HGB BLD-MCNC: 13.7 G/DL
IMM GRANULOCYTES NFR BLD AUTO: 0.2 %
LYMPHOCYTES # BLD AUTO: 2.78 K/UL
LYMPHOCYTES NFR BLD AUTO: 52.6 %
MAN DIFF?: NORMAL
MCHC RBC-ENTMCNC: 27.9 PG
MCHC RBC-ENTMCNC: 30.7 GM/DL
MCV RBC AUTO: 90.8 FL
MONOCYTES # BLD AUTO: 0.63 K/UL
MONOCYTES NFR BLD AUTO: 11.9 %
NEUTROPHILS # BLD AUTO: 1.68 K/UL
NEUTROPHILS NFR BLD AUTO: 31.7 %
PLATELET # BLD AUTO: 258 K/UL
RBC # BLD: 4.91 M/UL
RBC # FLD: 14.7 %
WBC # FLD AUTO: 5.29 K/UL

## 2020-09-18 ENCOUNTER — OUTPATIENT (OUTPATIENT)
Dept: OUTPATIENT SERVICES | Facility: HOSPITAL | Age: 55
LOS: 1 days | Discharge: HOME | End: 2020-09-18

## 2020-09-18 DIAGNOSIS — D72.820 LYMPHOCYTOSIS (SYMPTOMATIC): ICD-10-CM

## 2020-09-24 ENCOUNTER — APPOINTMENT (OUTPATIENT)
Dept: HEMATOLOGY ONCOLOGY | Facility: CLINIC | Age: 55
End: 2020-09-24
Payer: COMMERCIAL

## 2020-09-24 DIAGNOSIS — D72.9 DISORDER OF WHITE BLOOD CELLS, UNSPECIFIED: ICD-10-CM

## 2020-09-24 PROCEDURE — 99203 OFFICE O/P NEW LOW 30 MIN: CPT | Mod: 95

## 2020-09-24 NOTE — RESULTS/DATA
[FreeTextEntry1] : \par  Complete Blood Count w DIFF             Final\par \par No Documents Attached\par \par \par Result Annotated [id number] 01:15PM by CHANDAN SARMIENTO\par \par \par spoke to patient regarding labs and pt verbalized understanding.\par \par Result Annotated [id number] 07:41PM by YUDI MCCOY\par \par Spoke with patient. No anemia. Normal WBC count. Abnormal differential. Referred to Hematology. \par \par   Test   Result   Flag Reference Goal \par   WBC 5.29 K/uL   3.80-10.50  \par   RBC Count 4.91 M/uL   4.20-5.80  \par   HGB 13.7 g/dL   13.0-17.0  \par   HCT 44.6 %   39.0-50.0  \par   Mean Cell Volume 90.8 fl   80.0-100.0  \par   Mean Cell Hemoglobin 27.9 pg   27.0-34.0  \par  Mean Cell Hemoglobin Conc 30.7 gm/dL L 32.0-36.0  \par  Red Cell Distrib Width 14.7 % H 10.3-14.5  \par    K/uL   150-400  \par  Neutrophil # 1.68 K/uL L 1.80-7.40  \par   Lymphocyte # 2.78 K/uL   1.00-3.30  \par   Monocyte # 0.63 K/uL   0.00-0.90  \par   Eosinophil # 0.16 K/uL   0.00-0.50  \par   Basophil # 0.03 K/uL   0.00-0.20  \par  Neutrophil % 31.7 % L 43.0-77.0  \par   \par Differential percentages must be correlated with absolute numbers for clinical significance. \par \par  Lymphocyte % 52.6 % H 13.0-44.0  \par   Monocyte % 11.9 %   2.0-14.0  \par   Eosinophil % 3.0 %   0.0-6.0  \par   Basophil % 0.6 %   0.0-2.0  \par   Auto Immature Granulocyte % 0.2 %   0.0-1.5  \par   MAN DIFF? N/A   No  \par   \par Resulted by Discern \par \par \par  Ordered by: YUDI MCCOY       Collected/Examined: [id number] 02:06AM       \par Verified by: YUDI MCCOY [id number] 07:41PM       \par  Result Communication: Discussed results with patient;\par Stage: Final       \par  Performed at: [facility] (Med Director: Neel Shields M.D)       Resulted: [id number] 04:59AM       Last Updated: [id number] 01:15PM       Accession: [id number]

## 2020-09-24 NOTE — HISTORY OF PRESENT ILLNESS
[Home] : at home, [unfilled] , at the time of the visit. [Medical Office: ([facility])___] : at the medical office located in  [Verbal consent obtained from patient] : the patient, [unfilled] [Disease:__________________________] : Disease: [unfilled] [de-identified] : 53 yo male with hx of HLD, renal insufficiency, PFO, referred to  hematology for abnormal wbc percentage. Pt is asymptomatic, no acute complaints. wbc ct is wnl. Diff showed mildly low ANC 1680 (nl 1800), remaining diff wnl.

## 2020-10-20 ENCOUNTER — APPOINTMENT (OUTPATIENT)
Dept: UROLOGY | Facility: CLINIC | Age: 55
End: 2020-10-20
Payer: COMMERCIAL

## 2020-10-20 ENCOUNTER — OUTPATIENT (OUTPATIENT)
Dept: OUTPATIENT SERVICES | Facility: HOSPITAL | Age: 55
LOS: 1 days | End: 2020-10-20
Payer: COMMERCIAL

## 2020-10-20 VITALS — TEMPERATURE: 97.2 F

## 2020-10-20 DIAGNOSIS — R79.89 OTHER SPECIFIED ABNORMAL FINDINGS OF BLOOD CHEMISTRY: ICD-10-CM

## 2020-10-20 DIAGNOSIS — N20.0 CALCULUS OF KIDNEY: ICD-10-CM

## 2020-10-20 DIAGNOSIS — R35.0 FREQUENCY OF MICTURITION: ICD-10-CM

## 2020-10-20 PROCEDURE — 76775 US EXAM ABDO BACK WALL LIM: CPT | Mod: 26

## 2020-10-20 PROCEDURE — 99212 OFFICE O/P EST SF 10 MIN: CPT | Mod: 25

## 2020-10-20 PROCEDURE — 76775 US EXAM ABDO BACK WALL LIM: CPT

## 2020-10-20 NOTE — ASSESSMENT
[FreeTextEntry1] : ULS looks good - stay hydrated especially now running to prevent more stones.\par GFR and PSA Ok.

## 2020-10-27 DIAGNOSIS — R79.89 OTHER SPECIFIED ABNORMAL FINDINGS OF BLOOD CHEMISTRY: ICD-10-CM

## 2020-10-27 DIAGNOSIS — N20.0 CALCULUS OF KIDNEY: ICD-10-CM

## 2021-04-20 ENCOUNTER — APPOINTMENT (OUTPATIENT)
Dept: CARDIOLOGY | Facility: CLINIC | Age: 56
End: 2021-04-20
Payer: COMMERCIAL

## 2021-04-20 ENCOUNTER — NON-APPOINTMENT (OUTPATIENT)
Age: 56
End: 2021-04-20

## 2021-04-20 VITALS
RESPIRATION RATE: 12 BRPM | BODY MASS INDEX: 25.87 KG/M2 | OXYGEN SATURATION: 97 % | TEMPERATURE: 96.8 F | WEIGHT: 191 LBS | SYSTOLIC BLOOD PRESSURE: 109 MMHG | HEART RATE: 78 BPM | DIASTOLIC BLOOD PRESSURE: 77 MMHG | HEIGHT: 72 IN

## 2021-04-20 LAB
APPEARANCE: CLEAR
BILIRUBIN URINE: NEGATIVE
BLOOD URINE: NEGATIVE
CHOLEST SERPL-MCNC: 200 MG/DL
CHOLEST/HDLC SERPL: 3.1 RATIO
COLOR: YELLOW
GLUCOSE QUALITATIVE U: NEGATIVE
HDLC SERPL-MCNC: 64 MG/DL
KETONES URINE: NORMAL
LDLC SERPL CALC-MCNC: 114 MG/DL
LEUKOCYTE ESTERASE URINE: NEGATIVE
NITRITE URINE: NEGATIVE
PH URINE: 6
PROTEIN URINE: ABNORMAL
SPECIFIC GRAVITY URINE: 1.04
TRIGL SERPL-MCNC: 112 MG/DL
UROBILINOGEN URINE: NORMAL

## 2021-04-20 PROCEDURE — 93000 ELECTROCARDIOGRAM COMPLETE: CPT

## 2021-04-20 PROCEDURE — 99214 OFFICE O/P EST MOD 30 MIN: CPT | Mod: 25

## 2021-04-20 PROCEDURE — 99072 ADDL SUPL MATRL&STAF TM PHE: CPT

## 2021-04-21 NOTE — PHYSICAL EXAM
No [General Appearance - Well Developed] : well developed [Normal Appearance] : normal appearance [Well Groomed] : well groomed [General Appearance - Well Nourished] : well nourished [No Deformities] : no deformities [General Appearance - In No Acute Distress] : no acute distress [Normal Conjunctiva] : the conjunctiva exhibited no abnormalities [Normal Jugular Venous A Waves Present] : normal jugular venous A waves present [Normal Jugular Venous V Waves Present] : normal jugular venous V waves present [Respiration, Rhythm And Depth] : normal respiratory rhythm and effort [Auscultation Breath Sounds / Voice Sounds] : lungs were clear to auscultation bilaterally [Exaggerated Use Of Accessory Muscles For Inspiration] : no accessory muscle use [Normal Rate] : normal [Rhythm Regular] : regular [Normal S1] : normal S1 [Normal S2] : normal S2 [No Murmur] : no murmurs heard [No Pitting Edema] : no pitting edema present [Abdomen Soft] : soft [Abdomen Tenderness] : non-tender [Abnormal Walk] : normal gait [Gait - Sufficient For Exercise Testing] : the gait was sufficient for exercise testing [Nail Clubbing] : no clubbing of the fingernails [Cyanosis, Localized] : no localized cyanosis [Petechial Hemorrhages (___cm)] : no petechial hemorrhages [Skin Color & Pigmentation] : normal skin color and pigmentation [] : no rash [Oriented To Time, Place, And Person] : oriented to person, place, and time [Affect] : the affect was normal [Mood] : the mood was normal [No Anxiety] : not feeling anxious [Bowel Sounds] : normal bowel sounds [No Skin Ulcers] : no skin ulcer [FreeTextEntry1] : He was wearing a face mask during the examination.  [S3] : no S3 [Right Carotid Bruit] : no bruit heard over the right carotid [Left Carotid Bruit] : no bruit heard over the left carotid [Bruit] : no bruit heard

## 2021-04-21 NOTE — DISCUSSION/SUMMARY
[FreeTextEntry1] : IMPRESSION: Mr. Moctezuma is a 55 year old man with a history of hyperlipidemia, PFO, elevated LFTs, renal insufficiency, vitamin D deficiency, H. pylori, former tobacco use, and kidney stones who presents today for followup of hyperlipidemia.\par \par PLAN:\par 1. He will come back fasting for a CMP and lipid profile given his dyslipidemia. He will modify his diet and continue on Pravastatin 20 mg daily. Will also be evaluating for any adverse effects on his LFTs from Pravastatin. \par 2. He will continue on ASA 81 mg daily given his PFO.\par 3. I have asked him to stay well hydrated given his history of kidney stones. I have also asked him to stay hydrated given that his heart rate was initially elevated when he came to the office today. His heart rate was 101 when he came to the office, dropped to 92 on his ECG, and was around 78 beats per minute during the examination.\par 4. He will followup with me in 6 months or sooner should he experience any symptoms in the interim.\par 5. I spent approximately 30 minutes with the patient and his wife, including documentation, during this encounter.

## 2021-04-21 NOTE — HISTORY OF PRESENT ILLNESS
[FreeTextEntry1] : Patient is a 55 year old man with a history of hyperlipidemia, PFO, elevated LFTs, renal insufficiency, H. pylori, former tobacco use, and kidney stones who presents today for followup of hyperlipidemia. He states that he has been feeling well since his last visit with me denying any chest pain, dyspnea, palpitations, dizziness, and headaches. He got both doses of the COVID vaccine.

## 2021-05-05 LAB
25(OH)D3 SERPL-MCNC: 60.2 NG/ML
ALBUMIN SERPL ELPH-MCNC: 4.5 G/DL
ALP BLD-CCNC: 82 U/L
ALT SERPL-CCNC: 32 U/L
ANION GAP SERPL CALC-SCNC: 13 MMOL/L
APPEARANCE: CLEAR
AST SERPL-CCNC: 30 U/L
BASOPHILS # BLD AUTO: 0.04 K/UL
BASOPHILS NFR BLD AUTO: 0.7 %
BILIRUB SERPL-MCNC: 0.4 MG/DL
BILIRUBIN URINE: NEGATIVE
BLOOD URINE: NEGATIVE
BUN SERPL-MCNC: 12 MG/DL
CALCIUM SERPL-MCNC: 9.2 MG/DL
CHLORIDE SERPL-SCNC: 107 MMOL/L
CO2 SERPL-SCNC: 22 MMOL/L
COLOR: YELLOW
CREAT SERPL-MCNC: 1.2 MG/DL
EOSINOPHIL # BLD AUTO: 0.17 K/UL
EOSINOPHIL NFR BLD AUTO: 2.9 %
ESTIMATED AVERAGE GLUCOSE: 114 MG/DL
GLUCOSE QUALITATIVE U: NEGATIVE
GLUCOSE SERPL-MCNC: 94 MG/DL
HBA1C MFR BLD HPLC: 5.6 %
HCT VFR BLD CALC: 46.3 %
HGB BLD-MCNC: 14.2 G/DL
IMM GRANULOCYTES NFR BLD AUTO: 0.2 %
KETONES URINE: NEGATIVE
LEUKOCYTE ESTERASE URINE: NEGATIVE
LYMPHOCYTES # BLD AUTO: 2.49 K/UL
LYMPHOCYTES NFR BLD AUTO: 42.1 %
MAN DIFF?: NORMAL
MCHC RBC-ENTMCNC: 27.4 PG
MCHC RBC-ENTMCNC: 30.7 GM/DL
MCV RBC AUTO: 89.4 FL
MONOCYTES # BLD AUTO: 0.61 K/UL
MONOCYTES NFR BLD AUTO: 10.3 %
NEUTROPHILS # BLD AUTO: 2.6 K/UL
NEUTROPHILS NFR BLD AUTO: 43.8 %
NITRITE URINE: NEGATIVE
PH URINE: 6
PLATELET # BLD AUTO: 305 K/UL
POTASSIUM SERPL-SCNC: 4.6 MMOL/L
PROT SERPL-MCNC: 7.3 G/DL
PROTEIN URINE: NORMAL
RBC # BLD: 5.18 M/UL
RBC # FLD: 14.2 %
SODIUM SERPL-SCNC: 142 MMOL/L
SPECIFIC GRAVITY URINE: 1.02
TSH SERPL-ACNC: 1.02 UIU/ML
URATE SERPL-MCNC: 5.5 MG/DL
UROBILINOGEN URINE: NORMAL
WBC # FLD AUTO: 5.92 K/UL

## 2021-05-18 LAB
CHOLEST SERPL-MCNC: 209 MG/DL
HDLC SERPL-MCNC: 63 MG/DL
LDLC SERPL CALC-MCNC: 130 MG/DL
NONHDLC SERPL-MCNC: 147 MG/DL
TRIGL SERPL-MCNC: 84 MG/DL

## 2021-12-22 ENCOUNTER — APPOINTMENT (OUTPATIENT)
Dept: CARDIOLOGY | Facility: CLINIC | Age: 56
End: 2021-12-22

## 2022-01-26 ENCOUNTER — RX RENEWAL (OUTPATIENT)
Age: 57
End: 2022-01-26

## 2023-10-19 ENCOUNTER — APPOINTMENT (OUTPATIENT)
Dept: CARDIOLOGY | Facility: CLINIC | Age: 58
End: 2023-10-19
Payer: COMMERCIAL

## 2023-10-19 ENCOUNTER — LABORATORY RESULT (OUTPATIENT)
Age: 58
End: 2023-10-19

## 2023-10-19 ENCOUNTER — NON-APPOINTMENT (OUTPATIENT)
Age: 58
End: 2023-10-19

## 2023-10-19 VITALS
OXYGEN SATURATION: 96 % | DIASTOLIC BLOOD PRESSURE: 74 MMHG | HEIGHT: 72 IN | BODY MASS INDEX: 25.87 KG/M2 | SYSTOLIC BLOOD PRESSURE: 111 MMHG | RESPIRATION RATE: 14 BRPM | WEIGHT: 191 LBS | HEART RATE: 87 BPM

## 2023-10-19 DIAGNOSIS — M25.521 PAIN IN RIGHT ELBOW: ICD-10-CM

## 2023-10-19 DIAGNOSIS — E78.5 HYPERLIPIDEMIA, UNSPECIFIED: ICD-10-CM

## 2023-10-19 DIAGNOSIS — K76.0 FATTY (CHANGE OF) LIVER, NOT ELSEWHERE CLASSIFIED: ICD-10-CM

## 2023-10-19 DIAGNOSIS — R94.31 ABNORMAL ELECTROCARDIOGRAM [ECG] [EKG]: ICD-10-CM

## 2023-10-19 DIAGNOSIS — N18.30 CHRONIC KIDNEY DISEASE, STAGE 3 UNSPECIFIED: ICD-10-CM

## 2023-10-19 DIAGNOSIS — R19.00 INTRA-ABDOMINAL AND PELVIC SWELLING, MASS AND LUMP, UNSPECIFIED SITE: ICD-10-CM

## 2023-10-19 PROCEDURE — 99214 OFFICE O/P EST MOD 30 MIN: CPT | Mod: 25

## 2023-10-19 PROCEDURE — 93000 ELECTROCARDIOGRAM COMPLETE: CPT

## 2023-10-20 ENCOUNTER — APPOINTMENT (OUTPATIENT)
Dept: CARDIOLOGY | Facility: CLINIC | Age: 58
End: 2023-10-20
Payer: COMMERCIAL

## 2023-10-20 DIAGNOSIS — R79.89 OTHER SPECIFIED ABNORMAL FINDINGS OF BLOOD CHEMISTRY: ICD-10-CM

## 2023-10-20 LAB
ALBUMIN SERPL ELPH-MCNC: 4.2 G/DL
ALP BLD-CCNC: 77 U/L
ALT SERPL-CCNC: 37 U/L
ANION GAP SERPL CALC-SCNC: 9 MMOL/L
AST SERPL-CCNC: 24 U/L
BILIRUB SERPL-MCNC: 0.4 MG/DL
BUN SERPL-MCNC: 16 MG/DL
CALCIUM SERPL-MCNC: 9.2 MG/DL
CHLORIDE SERPL-SCNC: 107 MMOL/L
CHOLEST SERPL-MCNC: 192 MG/DL
CO2 SERPL-SCNC: 27 MMOL/L
CREAT SERPL-MCNC: 1.27 MG/DL
EGFR: 65 ML/MIN/1.73M2
GLUCOSE SERPL-MCNC: 87 MG/DL
HDLC SERPL-MCNC: 63 MG/DL
LDLC SERPL CALC-MCNC: 118 MG/DL
NONHDLC SERPL-MCNC: 129 MG/DL
POTASSIUM SERPL-SCNC: 4.4 MMOL/L
PROT SERPL-MCNC: 6.8 G/DL
SODIUM SERPL-SCNC: 144 MMOL/L
TRIGL SERPL-MCNC: 54 MG/DL

## 2023-10-20 PROCEDURE — 93306 TTE W/DOPPLER COMPLETE: CPT

## 2023-10-20 RX ORDER — PRAVASTATIN SODIUM 20 MG/1
20 TABLET ORAL
Qty: 90 | Refills: 1 | Status: ACTIVE | COMMUNITY
Start: 2018-08-31 | End: 1900-01-01

## 2023-10-23 LAB
25(OH)D3 SERPL-MCNC: 54.8 NG/ML
APPEARANCE: CLEAR
BASOPHILS # BLD AUTO: 0.03 K/UL
BASOPHILS NFR BLD AUTO: 0.8 %
BILIRUBIN URINE: NEGATIVE
BLOOD URINE: NEGATIVE
COLOR: YELLOW
CREAT SPEC-SCNC: 383 MG/DL
EOSINOPHIL # BLD AUTO: 0.25 K/UL
EOSINOPHIL NFR BLD AUTO: 6.5 %
ESTIMATED AVERAGE GLUCOSE: 114 MG/DL
FOLATE SERPL-MCNC: 16.5 NG/ML
GLUCOSE QUALITATIVE U: NEGATIVE MG/DL
HBA1C MFR BLD HPLC: 5.6 %
HCT VFR BLD CALC: 43.8 %
HGB BLD-MCNC: 14 G/DL
IMM GRANULOCYTES NFR BLD AUTO: 0.3 %
KETONES URINE: ABNORMAL MG/DL
LEUKOCYTE ESTERASE URINE: NEGATIVE
LYMPHOCYTES # BLD AUTO: 1.57 K/UL
LYMPHOCYTES NFR BLD AUTO: 40.6 %
MAN DIFF?: NORMAL
MCHC RBC-ENTMCNC: 28.1 PG
MCHC RBC-ENTMCNC: 32 GM/DL
MCV RBC AUTO: 88 FL
MICROALBUMIN 24H UR DL<=1MG/L-MCNC: 10.9 MG/DL
MICROALBUMIN/CREAT 24H UR-RTO: 29 MG/G
MONOCYTES # BLD AUTO: 0.62 K/UL
MONOCYTES NFR BLD AUTO: 16 %
NEUTROPHILS # BLD AUTO: 1.39 K/UL
NEUTROPHILS NFR BLD AUTO: 35.8 %
NITRITE URINE: NEGATIVE
PH URINE: 6
PLATELET # BLD AUTO: 303 K/UL
PROTEIN URINE: 30 MG/DL
PSA SERPL-MCNC: 1.49 NG/ML
RBC # BLD: 4.98 M/UL
RBC # FLD: 14.1 %
SPECIFIC GRAVITY URINE: 1.03
TSH SERPL-ACNC: 0.77 UIU/ML
UROBILINOGEN URINE: 0.2 MG/DL
VIT B12 SERPL-MCNC: 830 PG/ML
WBC # FLD AUTO: 3.87 K/UL

## 2023-11-03 ENCOUNTER — APPOINTMENT (OUTPATIENT)
Dept: ORTHOPEDIC SURGERY | Facility: CLINIC | Age: 58
End: 2023-11-03

## 2023-12-15 NOTE — DISCHARGE NOTE PROVIDER - CARE PROVIDERS DIRECT ADDRESSES
Patient evaluated due to h.pylori gastritis and hyde's esophagus. On his last evaluation triple therapy for H.pylori was provided.  Denies nausea, vomits, dysphagia, odynophagia, heartburn, abdominal pain, diarrhea, constipation GI bleeding or weight loss [email or web address]

## 2024-05-01 ENCOUNTER — RX RENEWAL (OUTPATIENT)
Age: 59
End: 2024-05-01

## 2024-10-01 NOTE — DISCHARGE NOTE PROVIDER - HOSPITAL COURSE
Patient admitted to the urology service given rising Cr in setting of obstructing stone. Right ureteroscopy, balloon dilation, extraction of right ureteral stones was performed.         The patient tolerated the procedure well. There were no complications. The patient was extubated in the OR and transferred to the PACU in stable condition and transferred to the urology floor.         At the time of discharge, the patient was hemodynamically stable, was tolerating PO diet, was voiding urine, was ambulating, and was comfortable with adequate pain control. The patient was instructed to follow up with Dr. Garza within 1 week after discharge from the hospital. The patient/family felt comfortable with discharge. The patient was discharged to home. The patient had no other issues. No